# Patient Record
Sex: MALE | Race: BLACK OR AFRICAN AMERICAN | Employment: FULL TIME | URBAN - METROPOLITAN AREA
[De-identification: names, ages, dates, MRNs, and addresses within clinical notes are randomized per-mention and may not be internally consistent; named-entity substitution may affect disease eponyms.]

---

## 2017-02-28 ENCOUNTER — OFFICE VISIT (OUTPATIENT)
Dept: INTERNAL MEDICINE CLINIC | Age: 27
End: 2017-02-28

## 2017-02-28 VITALS
WEIGHT: 315 LBS | BODY MASS INDEX: 38.36 KG/M2 | DIASTOLIC BLOOD PRESSURE: 87 MMHG | RESPIRATION RATE: 19 BRPM | SYSTOLIC BLOOD PRESSURE: 124 MMHG | HEART RATE: 93 BPM | HEIGHT: 76 IN

## 2017-02-28 DIAGNOSIS — I10 ESSENTIAL HYPERTENSION: Primary | ICD-10-CM

## 2017-02-28 PROCEDURE — G8484 FLU IMMUNIZE NO ADMIN: HCPCS | Performed by: INTERNAL MEDICINE

## 2017-02-28 PROCEDURE — 99213 OFFICE O/P EST LOW 20 MIN: CPT | Performed by: INTERNAL MEDICINE

## 2017-02-28 PROCEDURE — G8427 DOCREV CUR MEDS BY ELIG CLIN: HCPCS | Performed by: INTERNAL MEDICINE

## 2017-02-28 PROCEDURE — G8417 CALC BMI ABV UP PARAM F/U: HCPCS | Performed by: INTERNAL MEDICINE

## 2017-02-28 PROCEDURE — 1036F TOBACCO NON-USER: CPT | Performed by: INTERNAL MEDICINE

## 2017-02-28 RX ORDER — AMLODIPINE BESYLATE 5 MG/1
5 TABLET ORAL DAILY
Qty: 30 TABLET | Refills: 5 | Status: SHIPPED | OUTPATIENT
Start: 2017-02-28 | End: 2018-04-24 | Stop reason: SDUPTHER

## 2017-02-28 ASSESSMENT — PATIENT HEALTH QUESTIONNAIRE - PHQ9
SUM OF ALL RESPONSES TO PHQ9 QUESTIONS 1 & 2: 0
2. FEELING DOWN, DEPRESSED OR HOPELESS: 0
SUM OF ALL RESPONSES TO PHQ QUESTIONS 1-9: 0
1. LITTLE INTEREST OR PLEASURE IN DOING THINGS: 0

## 2017-07-25 ENCOUNTER — HOSPITAL ENCOUNTER (OUTPATIENT)
Dept: OTHER | Age: 27
Discharge: HOME | End: 2017-07-25
Attending: PREVENTIVE MEDICINE | Admitting: PREVENTIVE MEDICINE

## 2017-07-27 LAB
NIL (NEGATIVE) SPOT CONTROL: 1
PANEL A SPOT COUNT: 0
PANEL B SPOT COUNT: 3
POSITIVE CONTROL SPOT COUNT: >20
TB CELL IMMUNE MEASURE: NEGATIVE

## 2018-04-24 ENCOUNTER — OFFICE VISIT (OUTPATIENT)
Dept: INTERNAL MEDICINE CLINIC | Age: 28
End: 2018-04-24

## 2018-04-24 VITALS
BODY MASS INDEX: 46.37 KG/M2 | HEART RATE: 88 BPM | SYSTOLIC BLOOD PRESSURE: 136 MMHG | RESPIRATION RATE: 18 BRPM | DIASTOLIC BLOOD PRESSURE: 74 MMHG | WEIGHT: 315 LBS

## 2018-04-24 DIAGNOSIS — Z00.00 ROUTINE GENERAL MEDICAL EXAMINATION AT A HEALTH CARE FACILITY: Primary | ICD-10-CM

## 2018-04-24 DIAGNOSIS — I10 ESSENTIAL HYPERTENSION: ICD-10-CM

## 2018-04-24 DIAGNOSIS — Z00.00 ROUTINE GENERAL MEDICAL EXAMINATION AT A HEALTH CARE FACILITY: ICD-10-CM

## 2018-04-24 LAB
A/G RATIO: 1.8 (ref 1.1–2.2)
ALBUMIN SERPL-MCNC: 4.7 G/DL (ref 3.4–5)
ALP BLD-CCNC: 112 U/L (ref 40–129)
ALT SERPL-CCNC: 23 U/L (ref 10–40)
ANION GAP SERPL CALCULATED.3IONS-SCNC: 18 MMOL/L (ref 3–16)
AST SERPL-CCNC: 18 U/L (ref 15–37)
BASOPHILS ABSOLUTE: 0 K/UL (ref 0–0.2)
BASOPHILS RELATIVE PERCENT: 0.6 %
BILIRUB SERPL-MCNC: 0.5 MG/DL (ref 0–1)
BUN BLDV-MCNC: 12 MG/DL (ref 7–20)
CALCIUM SERPL-MCNC: 9.6 MG/DL (ref 8.3–10.6)
CHLORIDE BLD-SCNC: 100 MMOL/L (ref 99–110)
CHOLESTEROL, TOTAL: 205 MG/DL (ref 0–199)
CO2: 26 MMOL/L (ref 21–32)
CREAT SERPL-MCNC: 0.9 MG/DL (ref 0.9–1.3)
EOSINOPHILS ABSOLUTE: 0.2 K/UL (ref 0–0.6)
EOSINOPHILS RELATIVE PERCENT: 3.4 %
GFR AFRICAN AMERICAN: >60
GFR NON-AFRICAN AMERICAN: >60
GLOBULIN: 2.6 G/DL
GLUCOSE BLD-MCNC: 87 MG/DL (ref 70–99)
HCT VFR BLD CALC: 48.5 % (ref 40.5–52.5)
HDLC SERPL-MCNC: 36 MG/DL (ref 40–60)
HEMOGLOBIN: 16.3 G/DL (ref 13.5–17.5)
LDL CHOLESTEROL CALCULATED: 140 MG/DL
LYMPHOCYTES ABSOLUTE: 2.7 K/UL (ref 1–5.1)
LYMPHOCYTES RELATIVE PERCENT: 37.6 %
MCH RBC QN AUTO: 29.5 PG (ref 26–34)
MCHC RBC AUTO-ENTMCNC: 33.6 G/DL (ref 31–36)
MCV RBC AUTO: 87.7 FL (ref 80–100)
MONOCYTES ABSOLUTE: 0.5 K/UL (ref 0–1.3)
MONOCYTES RELATIVE PERCENT: 7.4 %
NEUTROPHILS ABSOLUTE: 3.6 K/UL (ref 1.7–7.7)
NEUTROPHILS RELATIVE PERCENT: 51 %
PDW BLD-RTO: 14.3 % (ref 12.4–15.4)
PLATELET # BLD: 359 K/UL (ref 135–450)
PMV BLD AUTO: 7.8 FL (ref 5–10.5)
POTASSIUM SERPL-SCNC: 4.3 MMOL/L (ref 3.5–5.1)
RBC # BLD: 5.53 M/UL (ref 4.2–5.9)
SODIUM BLD-SCNC: 144 MMOL/L (ref 136–145)
TOTAL PROTEIN: 7.3 G/DL (ref 6.4–8.2)
TRIGL SERPL-MCNC: 145 MG/DL (ref 0–150)
TSH REFLEX: 4.13 UIU/ML (ref 0.27–4.2)
VLDLC SERPL CALC-MCNC: 29 MG/DL
WBC # BLD: 7.1 K/UL (ref 4–11)

## 2018-04-24 PROCEDURE — 99395 PREV VISIT EST AGE 18-39: CPT | Performed by: INTERNAL MEDICINE

## 2018-04-24 RX ORDER — AMLODIPINE BESYLATE 5 MG/1
5 TABLET ORAL DAILY
Qty: 30 TABLET | Refills: 11 | Status: SHIPPED | OUTPATIENT
Start: 2018-04-24 | End: 2019-05-01 | Stop reason: SDUPTHER

## 2018-04-24 ASSESSMENT — PATIENT HEALTH QUESTIONNAIRE - PHQ9
SUM OF ALL RESPONSES TO PHQ QUESTIONS 1-9: 0
1. LITTLE INTEREST OR PLEASURE IN DOING THINGS: 0
2. FEELING DOWN, DEPRESSED OR HOPELESS: 0
SUM OF ALL RESPONSES TO PHQ9 QUESTIONS 1 & 2: 0

## 2018-08-28 ENCOUNTER — HOSPITAL ENCOUNTER (OUTPATIENT)
Dept: PHYSICAL THERAPY | Age: 28
Discharge: HOME | End: 2018-08-31

## 2018-08-28 ASSESSMENT — PAIN DESCRIPTION - PROGRESSION: CLINICAL_PROGRESSION: GRADUALLY IMPROVING

## 2018-08-28 ASSESSMENT — PAIN DESCRIPTION - LOCATION: LOCATION: ARM;SHOULDER

## 2018-08-28 ASSESSMENT — PAIN SCALES - GENERAL: PAINLEVEL_OUTOF10: 4

## 2018-08-28 ASSESSMENT — PAIN DESCRIPTION - DESCRIPTORS: DESCRIPTORS: BURNING;SHARP

## 2018-08-28 ASSESSMENT — PAIN DESCRIPTION - DIRECTION: RADIATING_TOWARDS: UPPER ARM

## 2018-08-28 ASSESSMENT — PAIN DESCRIPTION - PAIN TYPE: TYPE: ACUTE PAIN

## 2018-08-28 ASSESSMENT — PAIN DESCRIPTION - ORIENTATION: ORIENTATION: LEFT

## 2018-08-28 ASSESSMENT — PAIN DESCRIPTION - FREQUENCY: FREQUENCY: CONTINUOUS

## 2018-08-28 ASSESSMENT — PAIN DESCRIPTION - ONSET: ONSET: SUDDEN

## 2018-08-28 NOTE — PLAN OF CARE
Manual Therapy               [] Aquatic Therapy       Other:    ? Frequency/Duration:  # Days per week: [] 1 day # Weeks: [] 1 week [x] 5 weeks     [x] 2 days?    [] 2 weeks [] 6 weeks     [] 3 days   [] 3 weeks [] 7 weeks     [] 4 days   [] 4 weeks [] 8 weeks         [] 9 weeks [] 10 weeks         [] 11 weeks [] 12 weeks    Rehab Potential/Progress: [x] Excellent [] Good [] Fair  [] Poor     Goals:      Short term goals  Time Frame for Short term goals: 2 weeks, 9/14/18  Short term goal 1: Pt will be able to tolerate gradually advancing activity/exercises w/o increased pain  Short term goal 2: Pt will be able to report min pain w/ usual daily activity  Long term goals  Time Frame for Long term goals : 4 weeks, 9/28/18  Long term goal 1: Pt will be independent w/ HEP and able to continue to manage his pain on his own after PT  Long term goal 2: Pt will be able to return to all prior work and home activity including lifting w/o pain  Long term goal 3: Pt will have equal and normal ROM and strength L UE w/o pain for usual activity    G-Code Selection: (On Eval and every 10th visit or Discharge)  MEASURE  [] Mobility: Walking and Moving Around     [] Current ()   [] Goal ()   [] DC ()  [] Changing/Maintaining Body Position     [] Current (8981)      [] Goal ()   [] DC ()  [x] Carrying / Moving / Handling Objects     [x] Current ()   [x] Goal ()   [] DC ()  [] Self-Care     [] Current ()   [] Goal ()   [] DC ()  [] Other PT/OT primary DX     [] Current ()   [] Goal ()   [] DC ()    SEVERITY  CURRENT  GOAL  DISCHARGE   [] CH (0% Impaired, Indep.)  [x] CI (1-19% Impaired, SBA-CGA)  [] CJ (20-39% Impaired, MIN A)  [] CK  (40-59% Impairment, Mod A)  [] CL  (60-79% Impairment, Max A)  [] CM  (80-99% Impairment, Dep.)   [] CN  (100% Impairment, Tot Dep.) [x] CH (0% Impaired, Indep.)  [] CI (1-19% Impaired, SBA-CGA)  [] CJ (20-39% Impaired, MIN A)  [] CK (40-59% Impairment, Mod A)  [] CL  (60-79% Impairment, Max A)  [] CM  (80-99% Impairment, Dep.)   [] CN  (100% Impairment, Tot Dep.)  [] CH (0% Impaired, Indep.)  [] CI (1-19% Impaired, SBA-CGA)  [] CJ (20-39% Impaired, MIN A)  [] CK  (40-59% Impairment, Mod A)  [] CL  (60-79% Impairment, Max A)  [] CM  (80-99% Impairment, Dep.)   [] CN  (100% Impairment, Tot Dep.)          Electronically signed by:  Daiana Macias, PT, MPT, ATC  8/28/2018, 9:00 AM    8/28/2018, 9:01 AM  If you have any questions or concerns, please don't hesitate to call.   Thank you for your referral.      Physician Signature:________________________________Date:_________ TIME: _____  By signing above, therapists plan is approved by physician

## 2018-08-28 NOTE — PROGRESS NOTES
Physical Therapy  Initial Assessment  Date: 2018  Patient Name: Peter Laird  MRN: [id number]  : 1990     Treatment Diagnosis: L shoulder RTC/deltoid strain, pain, weakness    Restrictions  Position Activity Restriction  Other position/activity restrictions: none really, watch lifting and OH    Subjective   General  Chart Reviewed: Yes  Patient assessed for rehabilitation services?: Yes  Additional Pertinent Hx: helping pt get into truck and did most of lifting up and felt something in L shoulder, pain, this was in early Aug.  felt a pull in shoulder,  xray negative. No Cortizone but Naproxyn. Referring Practitioner: Karlee Nazario  Diagnosis: L shoulder pain/deltoid strain  PT Visit Information  PT Insurance Information: [facility]  Total # of Visits Approved: 10  Plan of Care/Certification Expiration Date: 10/15/18  Subjective  Subjective: Pain is improving some, increased by lifting heavy objects or reaching OH, better w/ rest, ice at first, meds. Sometimes pain during sleep, but can re-adjust and not lose too much sleep, he does note crepitus w/ movement  Pain Screening  Patient Currently in Pain: Yes  Pain Assessment  Pain Assessment: 0-10  Pain Level: 4 (max pain 8/10)  Pain Type: Acute pain  Pain Location: Arm; Shoulder  Pain Orientation: Left  Pain Radiating Towards: upper arm  Pain Descriptors: Burning; Sharp  Pain Frequency: Continuous  Pain Onset: Sudden  Clinical Progression: Gradually improving  Effect of Pain on Daily Activities: lifting and OH mostly  Patient's Stated Pain Goal: No pain  Vital Signs  Patient Currently in Pain: Yes    Vision/Hearing  Vision  Vision: Impaired    Orientation  Orientation  Overall Orientation Status: Within Normal Limits    Social/Functional History  Social/Functional History  Lives With: Alone  Type of Home: House  Occupation: Full time employment  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[employer], some transporting, some lifting of pts, lifting linens and such   Leisure & Hobbies: bball some, some golf and fishing at times too, does some cooking too  Objective     Observation/Palpation  Palpation: TTP anterior shoulder more than subacrom and min UT too L side    AROM RUE (degrees)  RUE AROM : WNL  AROM LUE (degrees)  LUE General AROM: norm flex and abd shoulder w/ min pain, ER scratch limited w/ pain to C7 vs T4 R, IR scratch min limit and min pain    Strength RUE  Strength RUE: WFL  Comment: flex and ER weaker than L  Strength LUE  Strength LUE: WFL  Comment: min pain on ER      Additional Measures  Special Tests: + Neer and Osorio on L and more pain w// supination on Butts, negative cross  Sensation  Overall Sensation Status: WNL    Assessment   Conditions Requiring Skilled Therapeutic Intervention  Body structures, Functions, Activity limitations: Decreased functional mobility ; Decreased ROM; Decreased strength;Decreased high-level IADLs  Assessment: Pt is a 34 yo male who presents w/ L shoulder pain from recent injury at work. He demonstrates TTP in anterior shoulder w/ min limited ROM and strength but decreased endurance and tolerance to functional activity and lifting. He will benefit from PT to help restore his ROM, strength and functional lifting ability in L UE. Prior to August of this year he had no pain or limited use in L UE. Patient agrees with established plan of care and assisted in the development of their short term and long term goals. Patient had no adverse reaction with initial treatment and there are no barriers to learning. Demonstrates no mental or cognitive disorder.     Treatment Diagnosis: L shoulder RTC/deltoid strain, pain, weakness  Prognosis: Excellent  Decision Making: Low Complexity  Patient Education: see flow sheet  Barriers to Learning: none  REQUIRES PT FOLLOW UP: Yes         Plan   Plan  Times per week: 2x/week  Plan weeks: 5 weeks  Specific instructions for Next Treatment: progress ROM and strength to stephon, ice prn, practice

## 2018-08-28 NOTE — FLOWSHEET NOTE
Other Therapeutic Activities/Education:  Patient received education on their current pathology and how their condition effects them with their functional activities. Patient understood discussion and questions were answered. Patient understands their activity limitations and understands rational for treatment progression. Home Exercise Program:  Issued, practiced and pt demo ability to perform 8/28/2018       Modality/intervention planned:    [x] Therapeutic Exercise  [x] Modalities:  [x] Therapeutic Activity     [] Ultrasound  [] Elec  Stim  [] Gait Training      [] Cervical Traction [] Lumbar Traction  [x] Neuromuscular Re-education    [x] Cold/hotpack [] Iontophoresis   [x] Instruction in HEP      [] Vasopneumatic     [x] Manual Therapy               [] Aquatic Therapy     Manual Treatments:      Modalities: CP to L shoulder, pt seated, 10'    Communication with other providers:  POC faxed 8/28/18    Education provided to patient/caregiver: Adverse reactions to treatment:      Equipment provided:      Assessment:  Assessment: Pt is a 32 yo male who presents w/ L shoulder pain from recent injury at work. He demonstrates TTP in anterior shoulder w/ min limited ROM and strength but decreased endurance and tolerance to functional activity and lifting. He will benefit from PT to help restore his ROM, strength and functional lifting ability in L UE. Prior to August of this year he had no pain or limited use in L UE. Patient agrees with established plan of care and assisted in the development of their short term and long term goals. Patient had no adverse reaction with initial treatment and there are no barriers to learning. Demonstrates no mental or cognitive disorder.       Time In / Time Out:    0810/0900               Timed Code/Total Treatment Minutes:  25/50    Patients Report of Tolerance:    [] Patient limited by fatigue        [] Patient limited by pain   [] Patient limited by other medical complications   [x] Other: tolerated w/ min pain    Prognosis:   [x] Good [] Fair  [] Poor    Plan:   [] Continue per plan of care [] Alter current plan (see comments)  [x] Plan of care initiated [] Hold pending MD visit [] Discharge    Plan for Next Session:   progress ROM and strength to stephon, ice prn, practice functional        Next Progress Note due:     At d/c       Electronically signed by:  Daiana Macias, PT, MPT, ATC  8/28/2018, 9:02 AM    8/28/2018, 9:04 AM

## 2018-08-31 ENCOUNTER — HOSPITAL ENCOUNTER (OUTPATIENT)
Dept: PHYSICAL THERAPY | Age: 28
Discharge: HOME | End: 2018-08-31

## 2018-08-31 NOTE — FLOWSHEET NOTE
Outpatient Physical Therapy           [facility]           [x] Phone: [phone]   Fax: [phone]  [place]           [] Phone: [phone]   Fax: [phone]    Physical Therapy Daily Treatment Note  Date:  2018    Patient Name:  Nita Nelson    :  1990  MRN: [id number]  Restrictions/Precautions: Other position/activity restrictions: none really, watch lifting and OH  Diagnosis:   Diagnosis: L shoulder pain/deltoid strain  Date of Surgery:   Treatment Diagnosis: Treatment Diagnosis: L shoulder RTC/deltoid strain, pain, weakness    Insurance/Certification information: PT Insurance Information: [facility]--10 Rx by 10/15   Referring Physician:  Referring Practitioner: Catarina Maguire  Next Doctor Visit:    Plan of care signed (Y/N): pending   Visit# / total visits:   2/10 C9 by 10/15  Pain level: 2/10   Goals:       Short term goals  Time Frame for Short term goals: 2 weeks, 18  Short term goal 1: Pt will be able to tolerate gradually advancing activity/exercises w/o increased pain  Short term goal 2: Pt will be able to report min pain w/ usual daily activity  Long term goals  Time Frame for Long term goals : 4 weeks, 18  Long term goal 1: Pt will be independent w/ HEP and able to continue to manage his pain on his own after PT  Long term goal 2: Pt will be able to return to all prior work and home activity including lifting w/o pain  Long term goal 3: Pt will have equal and normal ROM and strength L UE w/o pain for usual activity         Subjective:   Feeling pretty good today. HEP made it sore, but not bad. Any changes in Ambulatory Summary Sheet? NO      Objective:  Pt has some fatigue w/ scap exercises but good form and just soreness not pain.             Exercises:  Exercise/Equipment 18 Date Date            Supine cane flex    2\" hold 3x  10x5\"     Supine cane ER at 90   2\" hold 3x 10x5\"              Chest press TB    GTT 10x GTT 25x BTT    row   GTT 10x BTT 20x Taffy pull  GTB 10x GTB 20x     Wall push up   10x 20x Counter? Pritesh Lacer on wall 4way 10xea  20xea     Wall saw  - RSB 20x     Wall walk lateral w/ TB for scap -                  supine chest press   -- 3# B 20x      supine ABC -- 3# 1x      prone ext  row  -- 3# 20x  3# 20x                                                             Other Therapeutic Activities/Education:        Home Exercise Program:  Issued, practiced and pt demo ability to perform 8/28/2018       Modality/intervention planned:    [x] Therapeutic Exercise  [x] Modalities:  [x] Therapeutic Activity     [] Ultrasound  [] Elec  Stim  [] Gait Training      [] Cervical Traction [] Lumbar Traction  [x] Neuromuscular Re-education    [x] Cold/hotpack [] Iontophoresis   [x] Instruction in HEP      [] Vasopneumatic     [x] Manual Therapy               [] Aquatic Therapy     Manual Treatments: none today    Modalities: CP to L shoulder, pt seated, 10'    Communication with other providers:  POC faxed 8/28/18    Education provided to patient/caregiver: Adverse reactions to treatment:      Equipment provided:      Assessment:  Pt tolerated Rx very well today w/ just soreness after and no increased pain. He continues w/ tenderness and pain and limited functional strength/tolerance to usual activity and will benefit from continued PT to gradually increase challenge of activity to help him restore PLOF.       Time In / Time Out:   1500/1550             Timed Code/Total Treatment Minutes:   40/40 (CP not billed)    Patients Report of Tolerance:    [] Patient limited by fatigue        [] Patient limited by pain   [] Patient limited by other medical complications   [x] Other: tolerated w/ min pain    Prognosis:   [x] Good [] Fair  [] Poor    Plan:   [] Continue per plan of care [] Alter current plan (see comments)  [x] Plan of care initiated [] Hold pending MD visit [] Discharge    Plan for Next Session:   progress ROM and strength to stephon, ice prn, practice functional        Next Progress Note due:     At d/c       Electronically signed by:  Marlyn Guzmán PT, MPT, ATC  8/31/2018, 7:29 AM     8/31/2018, 4:02 PM

## 2018-09-01 ENCOUNTER — HOSPITAL ENCOUNTER (OUTPATIENT)
Dept: OTHER | Age: 28
Discharge: HOME | End: 2018-09-01

## 2018-09-05 ENCOUNTER — HOSPITAL ENCOUNTER (OUTPATIENT)
Dept: PHYSICAL THERAPY | Age: 28
Discharge: HOME | End: 2018-09-05

## 2018-09-05 NOTE — FLOWSHEET NOTE
Outpatient Physical Therapy           [facility]           [x] Phone: [phone]   Fax: [phone]  [place]           [] Phone: [phone]   Fax: [phone]    Physical Therapy Daily Treatment Note  Date:  2018    Patient Name:  Willis Olsen    :  1990  MRN: [id number]  Restrictions/Precautions: Other position/activity restrictions: none really, watch lifting and OH  Diagnosis:   Diagnosis: L shoulder pain/deltoid strain  Date of Surgery:   Treatment Diagnosis: Treatment Diagnosis: L shoulder RTC/deltoid strain, pain, weakness    Insurance/Certification information: PT Insurance Information: [facility]--10 Rx by 10/15   Referring Physician:  Referring Practitioner: Janet Steven  Next Doctor Visit:    Plan of care signed (Y/N): pending   Visit# / total visits:   3/10 C9 by 10/15  Pain level: 0/10       Goals:       Short term goals  Time Frame for Short term goals: 2 weeks, 18  Short term goal 1: Pt will be able to tolerate gradually advancing activity/exercises w/o increased pain Progressing   Short term goal 2: Pt will be able to report min pain w/ usual daily activity  Long term goals  Time Frame for Long term goals : 4 weeks, 18  Long term goal 1: Pt will be independent w/ HEP and able to continue to manage his pain on his own after PT  Long term goal 2: Pt will be able to return to all prior work and home activity including lifting w/o pain  Long term goal 3: Pt will have equal and normal ROM and strength L UE w/o pain for usual activity         Subjective:   Patient reports he currently has no pain int he shoulder, feels like the shoulder is getting better. Any changes in Ambulatory Summary Sheet? NO      Objective:  Cues to reduce UT activation during rows. Struggled quite a bit with proper muscle firing patterns for prone row.           Exercises:  Exercise/Equipment 18 Date          UBE    2'/2' f/b @ 120                    Supine cane flex    2\"

## 2018-09-13 ENCOUNTER — HOSPITAL ENCOUNTER (OUTPATIENT)
Dept: PHYSICAL THERAPY | Age: 28
Discharge: HOME | End: 2018-09-13

## 2018-09-13 NOTE — FLOWSHEET NOTE
Outpatient Physical Therapy           [facility]           [x] Phone: [phone]   Fax: [phone]  [facility]           [] Phone: [phone]   Fax: [phone]    Physical Therapy Daily Treatment Note  Date:  2018    Patient Name:  Lilibeth Chapman    :  1990  MRN: [id number]  Restrictions/Precautions: Other position/activity restrictions: none really, watch lifting and OH  Diagnosis:   Diagnosis: L shoulder pain/deltoid strain  Date of Surgery:   Treatment Diagnosis: Treatment Diagnosis: L shoulder RTC/deltoid strain, pain, weakness    Insurance/Certification information: PT Insurance Information: [facility]--10 Rx by 10/15   Referring Physician:  Referring Practitioner: Tati Lazo  Next Doctor Visit:    Plan of care signed (Y/N): Y  Visit# / total visits:   4/10 C9 by 10/15  Pain level: 0/10       Goals:       Short term goals  Time Frame for Short term goals: 2 weeks, 18  Short term goal 1: Pt will be able to tolerate gradually advancing activity/exercises w/o increased pain Progressing   Short term goal 2: Pt will be able to report min pain w/ usual daily activity Met   Long term goals  Time Frame for Long term goals : 4 weeks, 18  Long term goal 1: Pt will be independent w/ HEP and able to continue to manage his pain on his own after PT Progressing - reports compliance with HEP  Long term goal 2: Pt will be able to return to all prior work and home activity including lifting w/o pain Mostly met   Long term goal 3: Pt will have equal and normal ROM and strength L UE w/o pain for usual activity Met for ROM, did not check strength today          Subjective:   Patient states that the shoulder feels pretty good overall, really feels like it's getting better, pain is less often and less intense when it does happen. Max pain since here last was 3/10, doesn't remember what he was doing. No pain with working last night.  Reports no issues with lifting, or reaching behind his back or behind his head. Has some very mild pain with OH activity. Any changes in Ambulatory Summary Sheet? NO      Objective:  Fatigue with OH ball on wall, had to take a couple of breaks. AROM  Equal and full bilaterally and with no pain          Exercises:  Exercise/Equipment 8/28/18 8/31/18 9/5/18 9/13/18          UBE    2'/2' f/b @ 120 2'/2' f/b @ 100                   Supine cane flex    2\" hold 3x  10x5\" 10*5\" -   Supine cane ER at 90   2\" hold 3x 10x5\" 10*5\" -            Chest press TB    GTT 10x GTT 25x BTT 2*10 BTT 2*15   row   GTT 10x BTT 20x BTT 2*10 BTT 2*15   Taffy pull  GTB 10x GTB 20x GTB 20* GTB 2*15   Wall push up   10x 20x Counter 2*10 Counter 2*10   Ball on wall 4way 10xea  20xea 20* ea  2# in flexion    Wall saw  - RSB 20x RSB 20* RSB 25*   Wall walk lateral w/ TB for scap -  - Forgot               supine chest press   -- 3# B 20x 3# 20* -   supine ABC -- 3# 1x 3# 1* -   prone   ext  row   HA -- 3# 20x  3# 20x 3# 20*  3# 20* Prone on ball   3# 20*  3# 20*  Next             Body blade IR/ER, abd at side, flexion at 90°    2*30\" ea             D1 extension     GTT 2*10   Lat pull down     DGTT 2*10            Rebounder toss    6# 20* CP, OH      Other Therapeutic Activities/Education:        Home Exercise Program:  Issued, practiced and pt demo ability to perform 8/28/2018; added D1 extension and lat pull down 9/13;       Modality/intervention planned:    [x] Therapeutic Exercise  [x] Modalities:  [x] Therapeutic Activity     [] Ultrasound  [] Elec  Stim  [] Gait Training      [] Cervical Traction [] Lumbar Traction  [x] Neuromuscular Re-education    [x] Cold/hotpack [] Iontophoresis   [x] Instruction in HEP      [] Vasopneumatic     [x] Manual Therapy               [] Aquatic Therapy     Manual Treatments: none today    Modalities:  Patient declined today.     Communication with other providers:  POC faxed 8/28/18    Education provided to patient/caregiver:  None     Adverse reactions to

## 2018-09-19 ENCOUNTER — HOSPITAL ENCOUNTER (OUTPATIENT)
Dept: PHYSICAL THERAPY | Age: 28
Discharge: HOME | End: 2018-09-19

## 2018-09-19 NOTE — FLOWSHEET NOTE
Outpatient Physical Therapy           [place]           [x] Phone: [phone]   Fax: [phone]  [place]           [] Phone: [phone]   Fax: [phone]    Physical Therapy Daily Treatment Note  Date:  2018    Patient Name:  Luisa Najera    :  1990  MRN: [id number]  Restrictions/Precautions: Other position/activity restrictions: none really, watch lifting and OH  Diagnosis:   Diagnosis: L shoulder pain/deltoid strain  Date of Surgery:   Treatment Diagnosis: Treatment Diagnosis: L shoulder RTC/deltoid strain, pain, weakness    Insurance/Certification information: PT Insurance Information: [facility]--10 Rx by 10/15   Referring Physician:  Referring Practitioner: Go Gruber  Next Doctor Visit:    Plan of care signed (Y/N): Y  Visit# / total visits:   5/10 C9 by 10/15  Pain level: 0/10       Goals:       Short term goals  Time Frame for Short term goals: 2 weeks, 18  Short term goal 1: Pt will be able to tolerate gradually advancing activity/exercises w/o increased pain Progressing   Short term goal 2: Pt will be able to report min pain w/ usual daily activity Met   Long term goals  Time Frame for Long term goals : 4 weeks, 18  Long term goal 1: Pt will be independent w/ HEP and able to continue to manage his pain on his own after PT Progressing - reports compliance with HEP  Long term goal 2: Pt will be able to return to all prior work and home activity including lifting w/o pain Mostly met   Long term goal 3: Pt will have equal and normal ROM and strength L UE w/o pain for usual activity Met for ROM, did not check strength today          Subjective:   Patient reports no pain in the shoulder. \"Feels pretty good\". Felt fine after new exercises last session. Doesn't do a lot of OH lifting at work. Some pushing/pulling. 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 Any changes in Ambulatory Summary Sheet?   NO      Objective:  Min cueing for appropriate scap activation. Exercises:  Exercise/Equipment 9/5/18 9/13/18 9/19/18         UBE  2'/2' f/b @ 120 2'/2' f/b @ 100 2'/2' f/b @ 60                 Supine cane flex    10*5\" - -   Supine cane ER at 90   10*5\" - -           Chest press TB    BTT 2*10 BTT 2*15 BlkTT 2*15   row  (NR) BTT 2*10 BTT 2*15 Single arm FT 3plt 2*10   Taffy pull (NR) GTB 20* GTB 2*15 BTB 2*15   Wall push up  (TA) Counter 2*10 Counter 2*10 Shuttle 2C 2*10   Ball on wall 4way (TA) 20* ea  2# in flexion  2# in flexion 20* ea dir    Wall saw (NR) RSB 20* RSB 25* RSB 25*   Wall walk lateral w/ TB for scap - Forgot  GTB 4*20'             supine chest press   3# 20* - -   supine ABC 3# 1* - -   prone (NR)  ext  row   HA 3# 20*  3# 20* Prone on ball   3# 20*  3# 20*  Next  Prone on ball   3# 20*  3# 20*  0# 20*           Body blade IR/ER, abd at side, flexion at 90° (NR)  2*30\" ea  2*30\" ea            D1 extension (NR)  GTT 2*10 GTT 2*15   D2 flexion (NR)   GTT 2*10   Lat pull down (NR)  DGTT 2*10 CC 70# 2*10   Straight arm pull down    CC 50# 2*10   Fitter side/side    2 blk bands 20*   Sled push-pull (TA)   +80# 4*50'   TRX rows (TA)   20*   Rebounder toss (NR)  6# 20* CP, OH  6# 20* CP, OH      Other Therapeutic Activities/Education:        Home Exercise Program:  Issued, practiced and pt demo ability to perform 8/28/2018; added D1 extension and lat pull down 9/13;       Modality/intervention planned:    [x] Therapeutic Exercise  [x] Modalities:  [x] Therapeutic Activity     [] Ultrasound  [] Elec  Stim  [] Gait Training      [] Cervical Traction [] Lumbar Traction  [x] Neuromuscular Re-education    [x] Cold/hotpack [] Iontophoresis   [x] Instruction in HEP      [] Vasopneumatic     [x] Manual Therapy               [] Aquatic Therapy     Manual Treatments: none today    Modalities:  Patient declined today.     Communication with other providers:  POC faxed 8/28/18    Education provided to patient/caregiver:  None     Adverse reactions to treatment:  None     Equipment provided:  None     Assessment:   Patient continues to tolerate exercise progressions at each session without an increase in pain in the shoulder. Will benefit from a few more sessions to push progression and ensure patient has full strength without pain. Scapular activation is improving. 2/10 soreness after rx.     Time In / Time Out:  1015/1110        Timed Code/Total Treatment Minutes:  54' 1 TA 15' 1 TE 12' 2 NR 28'    Patients Report of Tolerance:    [] Patient limited by fatigue        [] Patient limited by pain   [] Patient limited by other medical complications   [x] Other: tolerated w/ min pain    Prognosis:   [x] Good [] Fair  [] Poor    Plan:   [x] Continue per plan of care [] Alter current plan (see comments)  [] Plan of care initiated [] Hold pending MD visit [] Discharge    Plan for Next Session:   progress ROM and strength to stephon, ice prn, practice functional        Next Progress Note due:     At d/c       Electronically signed by:  Chary Trinidad PTA       9/19/2018, 9:45 AM

## 2018-09-25 ENCOUNTER — HOSPITAL ENCOUNTER (OUTPATIENT)
Dept: PHYSICAL THERAPY | Age: 28
Setting detail: THERAPIES SERIES
Discharge: HOME | End: 2018-09-25
Payer: COMMERCIAL

## 2018-09-25 PROCEDURE — G8985 CARRY GOAL STATUS: HCPCS

## 2018-09-25 PROCEDURE — 97110 THERAPEUTIC EXERCISES: CPT

## 2018-09-25 PROCEDURE — G8986 CARRY D/C STATUS: HCPCS

## 2018-09-25 PROCEDURE — 97530 THERAPEUTIC ACTIVITIES: CPT

## 2018-09-25 NOTE — FLOWSHEET NOTE
Outpatient Physical Therapy           [place]           [x] Phone: [phone]   Fax: [phone]  [place]           [] Phone: [phone]   Fax: [phone]    Physical Therapy Daily Treatment Note  Date:  2018    Patient Name:  Milka Blanca    :  1990  MRN: [id number]  Restrictions/Precautions: Other position/activity restrictions: none really, watch lifting and OH  Diagnosis:   Diagnosis: L shoulder pain/deltoid strain  Date of Surgery:   Treatment Diagnosis: Treatment Diagnosis: L shoulder RTC/deltoid strain, pain, weakness    Insurance/Certification information: PT Insurance Information: [facility]--10 Rx by 10/15   Referring Physician:  Referring Practitioner: Esthela Meléndez Doctor Visit:    Plan of care signed (Y/N): Y  Visit# / total visits:   7/10 C9 by 10/15  Pain level: 0/10       Goals:       Short term goals  Time Frame for Short term goals: 2 weeks, 18  Short term goal 1: Pt will be able to tolerate gradually advancing activity/exercises w/o increased pain Met  Short term goal 2: Pt will be able to report min pain w/ usual daily activity Met   Long term goals  Time Frame for Long term goals : 4 weeks, 18  Long term goal 1: Pt will be independent w/ HEP and able to continue to manage his pain on his own after PT Progressing - reports compliance with HEP  Long term goal 2: Pt will be able to return to all prior work and home activity including lifting w/o pain Met   Long term goal 3: Pt will have equal and normal ROM and strength L UE w/o pain for usual activity Met for ROM, did not check strength today          Subjective: Pt feeling really good overall, no pain since here last and no reported limitations at work or home. Any changes in Ambulatory Summary Sheet? NO      Objective:   MMT is mostly equal B now and pain free. Mechanics w/ exercises are good but pt is somewhat weak for his size overall in certain patterns for shoulder movements.   Pt had some fatigue w/ exercises.              Exercises:  Exercise/Equipment 9/13/18 9/19/18 9/21/18 9/25/18          UBE  2'/2' f/b @ 100 2'/2' f/b @ 61 2'/2' f/b @ 61 2'/2' f/b @ 60                            Chest press TB    BTT 2*15 BlkTT 2*15 DGTT 2*15 DGTT 2x15   row  (NR) BTT 2*15 Single arm FT 3plt 2*10 3plt 2*10 DGTT 15x2   Taffy pull (NR) GTB 2*15 BTB 2*15 BTB 2*15 BTB 15x2   Wall push up  (TA) Counter 2*10 Shuttle 2C 2*10 2C 2*10 Walk out on ball / pu 10x   Ball on wall 4way (TA) 2# in flexion  2# in flexion 20* ea dir  2# in flexion 20* ea 2# in flexion 20x ea   Wall saw (NR) RSB 25* RSB 25* Pink SB 30* Pink SB 30x   Wall walk lateral w/ TB for scap Forgot  GTB 4*20' GTB 4*20' --              prone (NR)  ext  row   HA Prone on ball   3# 20*  3# 20*  Next  Prone on ball   3# 20*  3# 20*  0# 20* Prone on ball   3# 2*15  3# 2*15  20* Prone on ball   3# 20x  3# 20x  3# 20            Body blade IR/ER, abd at side, flexion at 90° (NR) 2*30\" ea  2*30\" ea  2*30\" ea dir  2x30\" ea dir            D1 extension (NR) GTT 2*10 GTT 2*15 - --   D2 flexion (NR)  GTT 2*10 - Flex 3pl 10x2 ext 2 pl  10x2   Incline/OH press   - 5# 15x2   Lat pull down (NR) DGTT 2*10 CC 70# 2*10 CC 70# 2*10 CC 70# 15x2   Straight arm pull down   CC 50# 2*10 CC 50# 2*10 --   Fitter side/side   2 blk bands 20* 2 blk bands 20* --   Sled push-pull (TA)  +80# 4*50' +80# 4*50' +80# 2 \"laps\"   TRX rows (TA)  20* 20* 10x2   Rebounder toss (NR) 6# 20* CP, OH  6# 20* CP, OH  - --     Other Therapeutic Activities/Education:  Reviewed gym work out, ok to do whatever doesn't hurt but start back lighter weights and higher reps and gradually work back up again, modify angles prn like incline vs OH      Home Exercise Program:  Issued, practiced and pt demo ability to perform 8/28/2018; added D1 extension and lat pull down 9/13;       Modality/intervention planned:    [x] Therapeutic Exercise  [x] Modalities:  [x] Therapeutic Activity     [] Ultrasound  [] Elec

## 2018-09-25 NOTE — PROGRESS NOTES
Outpatient Physical Therapy           [place]           [x] Phone: [phone]   Fax: [phone]  [place]           [] Phone: [phone]   Fax: [phone]      To: Jose Juan Roman      From: Daiana Macias, PT     Patient: Nita Nelson                  : 1990  Diagnosis:  L shoulder pain/deltoid strain   Date: 2018  Treatment Diagnosis: L shoulder RTC/deltoid strain, pain, weakness        []  Progress Note                [x]  Discharge Note    Evaluation Date:  18 Total Visits to date:   7 Cancels/No-shows to date:  1    Subjective:  Pt feeling really good overall, no pain since here last and no reported limitations at work or home. Plan of Care/Treatment to date:  [x] Therapeutic Exercise    [x] Modalities:  [x] Therapeutic Activity     [] Ultrasound  [] Electrical Stimulation  [] Gait Training      [] Cervical Traction   [] Lumbar Traction  [x] Neuromuscular Re-education  [x] Cold/hotpack [] Iontophoresis  [x] Instruction in HEP      Other:  [x] Manual Therapy       []  Vasopneumatic  [] Aquatic Therapy       []                    ? Objective/Significant Findings At Last Visit/Comments:  MMT is mostly equal B now and pain free. Mechanics w/ exercises are good but pt is somewhat weak for his size overall in certain patterns for shoulder movements. Pt had some fatigue w/ exercises. Assessment:  Patient tolerated session well. He is doing very well overall and able to continue on his own at the gym at this time w/ full ROM and strength and goals met. No pain after Rx.         Goal Status:  [x] Achieved [] Partially Achieved  [] Not Achieved   Short term goals  Time Frame for Short term goals: 2 weeks, 18  Short term goal 1: Pt will be able to tolerate gradually advancing activity/exercises w/o increased pain Met  Short term goal 2: Pt will be able to report min pain w/ usual daily activity Met   Long term goals  Time Frame for Long term goals : 4 weeks, per initial plan of Care     [x] Patient now discharged     [] Additional visits requested, Please re-certify for additional visits:      Requested frequency/duration:  /week for weeks    If we are requesting more visits, we fully anticipate the patient's condition is expected to improve within the treatment timeframe we are requesting. Electronically signed by:  Esthela Sky, PT, MPT, ATC  9/25/2018, 10:14 AM    9/25/2018, 10:15 AM  If you have any questions or concerns, please don't hesitate to call.   Thank you for your referral.

## 2018-09-27 ENCOUNTER — APPOINTMENT (OUTPATIENT)
Dept: PHYSICAL THERAPY | Age: 28
End: 2018-09-27
Payer: COMMERCIAL

## 2018-10-30 ENCOUNTER — OFFICE VISIT (OUTPATIENT)
Dept: INTERNAL MEDICINE CLINIC | Age: 28
End: 2018-10-30
Payer: COMMERCIAL

## 2018-10-30 VITALS
DIASTOLIC BLOOD PRESSURE: 84 MMHG | SYSTOLIC BLOOD PRESSURE: 139 MMHG | HEART RATE: 88 BPM | RESPIRATION RATE: 15 BRPM | WEIGHT: 315 LBS | BODY MASS INDEX: 46.6 KG/M2 | OXYGEN SATURATION: 97 %

## 2018-10-30 DIAGNOSIS — I10 ESSENTIAL HYPERTENSION: Primary | ICD-10-CM

## 2018-10-30 DIAGNOSIS — R73.9 HYPERGLYCEMIA: ICD-10-CM

## 2018-10-30 DIAGNOSIS — Z83.3 FAMILY HISTORY OF DIABETES MELLITUS (DM): ICD-10-CM

## 2018-10-30 DIAGNOSIS — E78.2 HYPERLIPEMIA, MIXED: ICD-10-CM

## 2018-10-30 PROCEDURE — 99214 OFFICE O/P EST MOD 30 MIN: CPT | Performed by: INTERNAL MEDICINE

## 2018-10-30 RX ORDER — ATENOLOL AND CHLORTHALIDONE TABLET 50; 25 MG/1; MG/1
1 TABLET ORAL DAILY
Qty: 30 TABLET | Refills: 3 | Status: SHIPPED | OUTPATIENT
Start: 2018-10-30 | End: 2019-05-01 | Stop reason: SDUPTHER

## 2019-05-01 ENCOUNTER — OFFICE VISIT (OUTPATIENT)
Dept: INTERNAL MEDICINE CLINIC | Age: 29
End: 2019-05-01
Payer: COMMERCIAL

## 2019-05-01 ENCOUNTER — HOSPITAL ENCOUNTER (OUTPATIENT)
Age: 29
Discharge: HOME | End: 2019-05-01
Payer: COMMERCIAL

## 2019-05-01 VITALS
RESPIRATION RATE: 16 BRPM | DIASTOLIC BLOOD PRESSURE: 80 MMHG | HEART RATE: 90 BPM | BODY MASS INDEX: 38.36 KG/M2 | WEIGHT: 315 LBS | OXYGEN SATURATION: 98 % | HEIGHT: 76 IN | SYSTOLIC BLOOD PRESSURE: 130 MMHG

## 2019-05-01 DIAGNOSIS — Z00.00 ROUTINE GENERAL MEDICAL EXAMINATION AT A HEALTH CARE FACILITY: Primary | ICD-10-CM

## 2019-05-01 DIAGNOSIS — R73.9 HYPERGLYCEMIA: ICD-10-CM

## 2019-05-01 DIAGNOSIS — E78.2 HYPERLIPEMIA, MIXED: ICD-10-CM

## 2019-05-01 DIAGNOSIS — I10 ESSENTIAL HYPERTENSION: ICD-10-CM

## 2019-05-01 LAB
ALBUMIN SERPL-MCNC: 4.3 GM/DL (ref 3.4–5)
ALP BLD-CCNC: 107 IU/L (ref 40–128)
ALT SERPL-CCNC: 31 U/L (ref 10–40)
ANION GAP SERPL CALCULATED.3IONS-SCNC: 10 MMOL/L (ref 4–16)
AST SERPL-CCNC: 27 IU/L (ref 15–37)
BASOPHILS ABSOLUTE: 0.1 K/CU MM
BASOPHILS RELATIVE PERCENT: 0.7 % (ref 0–1)
BILIRUB SERPL-MCNC: 0.3 MG/DL (ref 0–1)
BUN BLDV-MCNC: 11 MG/DL (ref 6–23)
CALCIUM SERPL-MCNC: 9.3 MG/DL (ref 8.3–10.6)
CHLORIDE BLD-SCNC: 102 MMOL/L (ref 99–110)
CHOLESTEROL: 173 MG/DL
CO2: 27 MMOL/L (ref 21–32)
CREAT SERPL-MCNC: 1.1 MG/DL (ref 0.9–1.3)
DIFFERENTIAL TYPE: ABNORMAL
EOSINOPHILS ABSOLUTE: 0.7 K/CU MM
EOSINOPHILS RELATIVE PERCENT: 8.3 % (ref 0–3)
ESTIMATED AVERAGE GLUCOSE: 120 MG/DL
GFR AFRICAN AMERICAN: >60 ML/MIN/1.73M2
GFR NON-AFRICAN AMERICAN: >60 ML/MIN/1.73M2
GLUCOSE BLD-MCNC: 95 MG/DL (ref 70–99)
HBA1C MFR BLD: 5.8 % (ref 4.2–6.3)
HCT VFR BLD CALC: 49.2 % (ref 42–52)
HDLC SERPL-MCNC: 36 MG/DL
HEMOGLOBIN: 15.6 GM/DL (ref 13.5–18)
IMMATURE NEUTROPHIL %: 0.5 % (ref 0–0.43)
LDL CHOLESTEROL DIRECT: 137 MG/DL
LYMPHOCYTES ABSOLUTE: 1.9 K/CU MM
LYMPHOCYTES RELATIVE PERCENT: 22.7 % (ref 24–44)
MCH RBC QN AUTO: 28.6 PG (ref 27–31)
MCHC RBC AUTO-ENTMCNC: 31.7 % (ref 32–36)
MCV RBC AUTO: 90.1 FL (ref 78–100)
MONOCYTES ABSOLUTE: 0.6 K/CU MM
MONOCYTES RELATIVE PERCENT: 7.3 % (ref 0–4)
NUCLEATED RBC %: 0 %
PDW BLD-RTO: 14.1 % (ref 11.7–14.9)
PLATELET # BLD: 359 K/CU MM (ref 140–440)
PMV BLD AUTO: 8.9 FL (ref 7.5–11.1)
POTASSIUM SERPL-SCNC: 4.3 MMOL/L (ref 3.5–5.1)
RBC # BLD: 5.46 M/CU MM (ref 4.6–6.2)
SEGMENTED NEUTROPHILS ABSOLUTE COUNT: 5.1 K/CU MM
SEGMENTED NEUTROPHILS RELATIVE PERCENT: 60.5 % (ref 36–66)
SODIUM BLD-SCNC: 139 MMOL/L (ref 135–145)
TOTAL IMMATURE NEUTOROPHIL: 0.04 K/CU MM
TOTAL NUCLEATED RBC: 0 K/CU MM
TOTAL PROTEIN: 6.6 GM/DL (ref 6.4–8.2)
TRIGL SERPL-MCNC: 109 MG/DL
TSH HIGH SENSITIVITY: 2.39 UIU/ML (ref 0.27–4.2)
WBC # BLD: 8.4 K/CU MM (ref 4–10.5)

## 2019-05-01 PROCEDURE — 80053 COMPREHEN METABOLIC PANEL: CPT

## 2019-05-01 PROCEDURE — 83036 HEMOGLOBIN GLYCOSYLATED A1C: CPT

## 2019-05-01 PROCEDURE — 84443 ASSAY THYROID STIM HORMONE: CPT

## 2019-05-01 PROCEDURE — 99395 PREV VISIT EST AGE 18-39: CPT | Performed by: INTERNAL MEDICINE

## 2019-05-01 PROCEDURE — 36415 COLL VENOUS BLD VENIPUNCTURE: CPT

## 2019-05-01 PROCEDURE — 85025 COMPLETE CBC W/AUTO DIFF WBC: CPT

## 2019-05-01 PROCEDURE — 80061 LIPID PANEL: CPT

## 2019-05-01 PROCEDURE — 83721 ASSAY OF BLOOD LIPOPROTEIN: CPT

## 2019-05-01 RX ORDER — ATENOLOL AND CHLORTHALIDONE TABLET 50; 25 MG/1; MG/1
1 TABLET ORAL DAILY
Qty: 30 TABLET | Refills: 11 | Status: SHIPPED | OUTPATIENT
Start: 2019-05-01 | End: 2019-06-24

## 2019-05-01 RX ORDER — AMLODIPINE BESYLATE 5 MG/1
5 TABLET ORAL DAILY
Qty: 30 TABLET | Refills: 11 | Status: SHIPPED | OUTPATIENT
Start: 2019-05-01 | End: 2020-07-31 | Stop reason: SDUPTHER

## 2019-05-01 ASSESSMENT — PATIENT HEALTH QUESTIONNAIRE - PHQ9
SUM OF ALL RESPONSES TO PHQ QUESTIONS 1-9: 0
SUM OF ALL RESPONSES TO PHQ9 QUESTIONS 1 & 2: 0
1. LITTLE INTEREST OR PLEASURE IN DOING THINGS: 0
DEPRESSION UNABLE TO ASSESS: FUNCTIONAL CAPACITY MOTIVATION LIMITS ACCURACY
2. FEELING DOWN, DEPRESSED OR HOPELESS: 0
SUM OF ALL RESPONSES TO PHQ QUESTIONS 1-9: 0

## 2019-05-01 NOTE — RESULT ENCOUNTER NOTE
Call pt, labs ok/sugars are normal and is not diabetic--- chol is better but remains borderline high.   Do rec to continue work w diet, exercise and also trying his best to lose another 10# if possible

## 2019-05-01 NOTE — PROGRESS NOTES
History and Physical      Miguel Chiu  YOB: 1990    Date of Service:  5/1/2019    Chief Complaint:   Miguel Chiu is a 33 y.o. male who presents for complete physical examination. HPI:   Been losing wt w diet and exercise, also packs lunch and eating out less. Hypertension: Stable. Denies CP, SOB, cough, visual changes, dizziness, palpitations or HA. Last glc high 2018 and w fhx of DM will screen a1c. Lipids:  Has history of high cholesterol, not on medication but trying to continue regular low fat diet and maintenance of weight, regular exercise. Wt Readings from Last 3 Encounters:   05/01/19 (!) 380 lb (172.4 kg)   10/30/18 (!) 393 lb (178.3 kg)   09/02/18 (!) 350 lb (158.8 kg)     BP Readings from Last 3 Encounters:   05/01/19 138/89   10/30/18 139/84   09/02/18 (!) 134/90       Patient Active Problem List   Diagnosis    Hypertension    Seborrheic dermatitis    Hyperpigmentation       Preventive Care:  Health Maintenance   Topic Date Due    Varicella Vaccine (1 of 2 - 13+ 2-dose series) 09/14/2004    HIV screen  09/14/2006    DTaP/Tdap/Td vaccine (1 - Tdap) 09/14/2010    Flu vaccine (Season Ended) 09/01/2019    HPV vaccine  Aged Out    Pneumococcal 0-64 years Vaccine  Aged Out        Lipid panel:    Lab Results   Component Value Date    TRIG 145 04/24/2018    HDL 36 04/24/2018    LDLCALC 140 04/24/2018         Immunization History   Administered Date(s) Administered    Influenza Virus Vaccine 11/16/2017       No Known Allergies  Current Outpatient Medications   Medication Sig Dispense Refill    atenolol-chlorthalidone (TENORETIC 50) 50-25 MG per tablet Take 1 tablet by mouth daily 30 tablet 3    amLODIPine (NORVASC) 5 MG tablet Take 1 tablet by mouth daily 30 tablet 11     No current facility-administered medications for this visit. Past Medical History:   Diagnosis Date    Hypertension      No past surgical history on file.   Family History oriented to person, place, and time. He appears well-developed and well-nourished. No distress. HEENT:   Head: Normocephalic and atraumatic. Nose: Nose normal.   Mouth/Throat: Oropharynx is clear and moist and mucous membranes are normal. No oropharyngeal exudate or posterior oropharyngeal erythema. He has no cervical adenopathy. Eyes: Conjunctivae and extraocular motions are normal. Pupils are equal, round, and reactive to light. Neck: Full passive range of motion without pain. Neck supple. No JVD present. No mass and no thyromegaly present. Cardiovascular: Normal rate, regular rhythm, normal heart sounds and intact distal pulses. Exam reveals no gallop and no friction rub. No murmur heard. Pulmonary/Chest: Effort normal and breath sounds normal. No respiratory distress. He has no wheezes, rhonchi or rales. Abdominal: Soft, non-tender. Bowel sounds and aorta are normal. There is no organomegaly, mass or bruit. Musculoskeletal: Normal range of motion, no synovitis. He exhibits no edema. Neurological: He is alert and oriented to person, place, and time. No cranial nerve deficit. Coordination normal.   Skin: Skin is warm, dry and intact. Psychiatric: He has a normal mood and affect. His speech is normal and behavior is normal. Judgment, cognition and memory are normal.         Assessment/Plan:    Wily Hartley was seen today for hypertension. Diagnoses and all orders for this visit:    Routine general medical examination at a health care facility- [address] DISCIPLINED DIET AND EXERCISE. Overall general medical exam appears benign, other assessments as noted and testing is as noted below. Essential hypertension - Blood pressure stable and will continue current regimen. Will plan periodic monitoring of renal function, electrolytes, lipid profile.     -     atenolol-chlorthalidone (TENORETIC 50) 50-25 MG per tablet;  Take 1 tablet by mouth daily  -     amLODIPine (NORVASC) 5 MG tablet; Take 1 tablet by mouth daily  -     Lipid Panel; Future  -     Comprehensive Metabolic Panel; Future  -     CBC Auto Differential; Future  -     TSH without Reflex; Future    Hyperglycemia - will continue to monitor fasting labs/glc for any progression to DM. Patient will continue to try to work on diet modification.    -     Hemoglobin A1C; Future    Hyperlipemia, mixed - Pt will continue to work on a low fat diet and also exercise, wt loss as appropriate. Will continue periodic monitoring of fasting lipid profile, glucose, liver function.    -     Lipid Panel; Future  -     Comprehensive Metabolic Panel; Future  -     CBC Auto Differential; Future  -     TSH without Reflex;  Future

## 2019-06-03 ENCOUNTER — HOSPITAL ENCOUNTER (EMERGENCY)
Age: 29
Discharge: HOME | End: 2019-06-03
Payer: COMMERCIAL

## 2019-06-03 ENCOUNTER — APPOINTMENT (OUTPATIENT)
Dept: GENERAL RADIOLOGY | Age: 29
End: 2019-06-03
Payer: COMMERCIAL

## 2019-06-03 VITALS
HEART RATE: 85 BPM | WEIGHT: 315 LBS | OXYGEN SATURATION: 96 % | TEMPERATURE: 98.7 F | RESPIRATION RATE: 10 BRPM | HEIGHT: 76 IN | SYSTOLIC BLOOD PRESSURE: 144 MMHG | DIASTOLIC BLOOD PRESSURE: 92 MMHG | BODY MASS INDEX: 38.36 KG/M2

## 2019-06-03 DIAGNOSIS — R42 LIGHTHEADEDNESS: ICD-10-CM

## 2019-06-03 DIAGNOSIS — R42 DIZZINESS: Primary | ICD-10-CM

## 2019-06-03 DIAGNOSIS — R09.81 NASAL CONGESTION: ICD-10-CM

## 2019-06-03 DIAGNOSIS — R03.0 ELEVATED BLOOD PRESSURE READING: ICD-10-CM

## 2019-06-03 LAB
ALBUMIN SERPL-MCNC: 4 GM/DL (ref 3.4–5)
ALP BLD-CCNC: 97 IU/L (ref 40–129)
ALT SERPL-CCNC: 21 U/L (ref 10–40)
ANION GAP SERPL CALCULATED.3IONS-SCNC: 8 MMOL/L (ref 4–16)
AST SERPL-CCNC: 15 IU/L (ref 15–37)
BASOPHILS ABSOLUTE: 0.1 K/CU MM
BASOPHILS RELATIVE PERCENT: 0.7 % (ref 0–1)
BILIRUB SERPL-MCNC: 0.2 MG/DL (ref 0–1)
BUN BLDV-MCNC: 11 MG/DL (ref 6–23)
CALCIUM SERPL-MCNC: 8.8 MG/DL (ref 8.3–10.6)
CHLORIDE BLD-SCNC: 104 MMOL/L (ref 99–110)
CO2: 25 MMOL/L (ref 21–32)
CREAT SERPL-MCNC: 1 MG/DL (ref 0.9–1.3)
DIFFERENTIAL TYPE: ABNORMAL
EOSINOPHILS ABSOLUTE: 0.6 K/CU MM
EOSINOPHILS RELATIVE PERCENT: 6.3 % (ref 0–3)
GFR AFRICAN AMERICAN: >60 ML/MIN/1.73M2
GFR NON-AFRICAN AMERICAN: >60 ML/MIN/1.73M2
GLUCOSE BLD-MCNC: 86 MG/DL (ref 70–99)
HCT VFR BLD CALC: 49.6 % (ref 42–52)
HEMOGLOBIN: 16 GM/DL (ref 13.5–18)
IMMATURE NEUTROPHIL %: 0.2 % (ref 0–0.43)
LYMPHOCYTES ABSOLUTE: 2 K/CU MM
LYMPHOCYTES RELATIVE PERCENT: 22.1 % (ref 24–44)
MCH RBC QN AUTO: 28.5 PG (ref 27–31)
MCHC RBC AUTO-ENTMCNC: 32.3 % (ref 32–36)
MCV RBC AUTO: 88.3 FL (ref 78–100)
MONOCYTES ABSOLUTE: 0.7 K/CU MM
MONOCYTES RELATIVE PERCENT: 7.6 % (ref 0–4)
NUCLEATED RBC %: 0 %
PDW BLD-RTO: 13.6 % (ref 11.7–14.9)
PLATELET # BLD: 402 K/CU MM (ref 140–440)
PMV BLD AUTO: 9.1 FL (ref 7.5–11.1)
POTASSIUM SERPL-SCNC: 3.9 MMOL/L (ref 3.5–5.1)
PRO-BNP: <5 PG/ML
RBC # BLD: 5.62 M/CU MM (ref 4.6–6.2)
REASON FOR REJECTION: NORMAL
REASON FOR REJECTION: NORMAL
REJECTED TEST: NORMAL
SEGMENTED NEUTROPHILS ABSOLUTE COUNT: 5.7 K/CU MM
SEGMENTED NEUTROPHILS RELATIVE PERCENT: 63.1 % (ref 36–66)
SODIUM BLD-SCNC: 137 MMOL/L (ref 135–145)
TOTAL IMMATURE NEUTOROPHIL: 0.02 K/CU MM
TOTAL NUCLEATED RBC: 0 K/CU MM
TOTAL PROTEIN: 7.5 GM/DL (ref 6.4–8.2)
TROPONIN T: <0.01 NG/ML
WBC # BLD: 9 K/CU MM (ref 4–10.5)

## 2019-06-03 PROCEDURE — 6370000000 HC RX 637 (ALT 250 FOR IP): Performed by: PHYSICIAN ASSISTANT

## 2019-06-03 PROCEDURE — 80053 COMPREHEN METABOLIC PANEL: CPT

## 2019-06-03 PROCEDURE — 93005 ELECTROCARDIOGRAM TRACING: CPT | Performed by: PHYSICIAN ASSISTANT

## 2019-06-03 PROCEDURE — 85025 COMPLETE CBC W/AUTO DIFF WBC: CPT

## 2019-06-03 PROCEDURE — 71045 X-RAY EXAM CHEST 1 VIEW: CPT

## 2019-06-03 PROCEDURE — 2580000003 HC RX 258: Performed by: PHYSICIAN ASSISTANT

## 2019-06-03 PROCEDURE — 83880 ASSAY OF NATRIURETIC PEPTIDE: CPT

## 2019-06-03 PROCEDURE — 99284 EMERGENCY DEPT VISIT MOD MDM: CPT

## 2019-06-03 PROCEDURE — 84484 ASSAY OF TROPONIN QUANT: CPT

## 2019-06-03 PROCEDURE — 36415 COLL VENOUS BLD VENIPUNCTURE: CPT

## 2019-06-03 RX ORDER — MECLIZINE HYDROCHLORIDE 25 MG/1
25 TABLET ORAL 3 TIMES DAILY PRN
Qty: 30 TABLET | Refills: 0 | Status: SHIPPED | OUTPATIENT
Start: 2019-06-03 | End: 2019-06-13

## 2019-06-03 RX ORDER — FLUTICASONE PROPIONATE 50 MCG
1 SPRAY, SUSPENSION (ML) NASAL 2 TIMES DAILY
Qty: 1 BOTTLE | Refills: 0 | Status: SHIPPED | OUTPATIENT
Start: 2019-06-03 | End: 2022-06-06

## 2019-06-03 RX ORDER — MECLIZINE HYDROCHLORIDE 25 MG/1
25 TABLET ORAL ONCE
Status: COMPLETED | OUTPATIENT
Start: 2019-06-03 | End: 2019-06-03

## 2019-06-03 RX ORDER — 0.9 % SODIUM CHLORIDE 0.9 %
1000 INTRAVENOUS SOLUTION INTRAVENOUS ONCE
Status: COMPLETED | OUTPATIENT
Start: 2019-06-03 | End: 2019-06-03

## 2019-06-03 RX ADMIN — SODIUM CHLORIDE 1000 ML: 9 INJECTION, SOLUTION INTRAVENOUS at 08:11

## 2019-06-03 RX ADMIN — MECLIZINE HYDROCHLORIDE 25 MG: 25 TABLET ORAL at 09:16

## 2019-06-03 NOTE — ED NOTES
[place] was redrawn on patient and urine was sent to lab on patient     Treva Man.  [facility]  06/03/19 [id number]

## 2019-06-03 NOTE — ED PROVIDER NOTES
eMERGENCY dEPARTMENT eNCOUnter      PCP: Jacque Brody MD    CHIEF COMPLAINT    Chief Complaint   Patient presents with    Dizziness     states heart begins racing after dizzy spell       HPI    Miguel Chiu is a 33 y.o. male who presents with concerns for dizzy spells. Patient states he started yesterday evening. Patient states that he was resting on the couch and would feel dizzy lightheaded like he could pass out of the room was spinning would last about a minute and then will resolve. Was not really associated with standing up or movement. Had a few separate episodes yesterday evening and then again this morning. When these are occurring patient denies any chest pain or shortness of breath. Denies any nausea vomiting diarrhea. Denies any recent changes in his medications. Has been on 2 blood pressure medications for at least 5 months. Currently denies any complaints but does have continual episodes that last about a minute long every once in a while, last this morning before he left to come in. Denies any fevers or chills. No chest pain or shortness of breath. No nausea vomiting diarrhea. Has had some nasal congestion last few days that he associates with allergies but has not been taking anything for his symptoms as allergy medications make him fatigued. Patient last took his blood pressure medication last night. Patient has been following regularly with his family doctor being worked up for obesity hypertension, early prediabetes, trialing blood pressure medications and dietary therapy. REVIEW OF SYSTEMS   Constitutional:  Denies fever, chills, weight loss or weakness   Neurologic:  See HPI. Denies confusion or memory loss. + light-headedness, dizziness, NO LOC. Denies stiff neck. Denies weakness or sensory changes   Eyes:   Denies discharge, diplopia, blurred vision, or loss visual field  HENT:  Denies sore throat or ear pain   GI  Denies abdominal pain.    No nausea, vomiting, or diarrhea. :  Denies Dysuria or Hematuria. Musculoskeletal:  Denies back pain. Skin:  Denies rash   Endocrine:  Denies polyuria or polydypsia   Lymphatic:  Denies swollen glands   Psychiatric:   Denies depression, suicidal ideation or homicidal ideation     All other review of systems negative at this time  See HPI and nursing notes for additional information      PAST MEDICAL & SURGICAL HISTORY    Past Medical History:   Diagnosis Date    Hypertension      History reviewed. No pertinent surgical history.     CURRENT MEDICATIONS    Current Outpatient Rx   Medication Sig Dispense Refill    fluticasone (FLONASE) 50 MCG/ACT nasal spray 1 spray by Nasal route 2 times daily 1 Bottle 0    meclizine (ANTIVERT) 25 MG tablet Take 1 tablet by mouth 3 times daily as needed for Dizziness 30 tablet 0    atenolol-chlorthalidone (TENORETIC 50) 50-25 MG per tablet Take 1 tablet by mouth daily 30 tablet 11    amLODIPine (NORVASC) 5 MG tablet Take 1 tablet by mouth daily 30 tablet 11       ALLERGIES    No Known Allergies    SOCIAL & FAMILY HISTORY    Social History     Socioeconomic History    Marital status: Single     Spouse name: None    Number of children: None    Years of education: None    Highest education level: None   Occupational History    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   Social Needs    Financial resource strain: None    Food insecurity:     Worry: None     Inability: None    Transportation needs:     Medical: None     Non-medical: None   Tobacco Use    Smoking status: Never Smoker    Smokeless tobacco: Never Used   Substance and Sexual Activity    Alcohol use: Yes     Comment: Occasional    Drug use: No    Sexual activity: None   Lifestyle    Physical activity:     Days per week: None     Minutes per session: None    Stress: None   Relationships    Social connections:     Talks on phone: None     Gets together: None     Attends [employer] service: None     Active member of club or organization: None     Attends meetings of clubs or organizations: None     Relationship status: None    Intimate partner violence:     Fear of current or ex partner: None     Emotionally abused: None     Physically abused: None     Forced sexual activity: None   Other Topics Concern    None   Social History Narrative    None     Family History   Problem Relation Age of Onset    Diabetes Mother     High Cholesterol Mother     Hypertension Mother     Diabetes Father     High Cholesterol Father     Hypertension Father        PHYSICAL EXAM    VITAL SIGNS: BP (!) 165/88   Pulse 88   Temp 98.7 °F (37.1 °C) (Oral)   Resp 20   Ht 6' 4\" (1.93 m)   Wt (!) 375 lb (170.1 kg)   SpO2 99%   BMI 45.65 kg/m²    Constitutional:  Well developed, well nourished, no acute distress. Afebrile. Elevated BMI    HENT:  Atraumatic. Eyes: Conjunctiva clear. No tearing . Pupils equally round and react to light, extraocular movement are intact. Funduscopic exam reveals red reflex intact without obvious retinal hemorrhages or defects. No obvious papilledema  Neck: supple, no JVD. Cardiovascular:  Reg rate & rhythm, no murmurs/rubs/gallops. Respiratory:  Lungs Clear, no retractions   GI:  Soft, nontender, normal bowel sounds  Musculoskeletal:  No edema, no deformities  Integument:  Well hydrated, no petechiae       Neurologic:    - Alert & oriented person, place, time, and situation, no speech difficulties or slurring.  - No obvious gross motor deficits  - Cranial nerves 2-12 grossly intact  - Sensation intact to light touch  - Strength 5/5 in upper and lower extremities bilaterally  - Negative Brudzinski's and Kernig's signs.  - Normal finger to nose test bilaterally  - Rapid alternating movements intact  - Normal heel-shin bilaterally  - No pronator drift. - Light touch sensation intact throughout. - Upper and lower extremity DTRs 2+ bilaterally.   - Gait steady and without difficulty      Psych: Pleasant affect, no hallucinations      LABS:   Results for orders placed or performed during the hospital encounter of 06/03/19   CBC auto diff   Result Value Ref Range    WBC 9.0 4.0 - 10.5 K/CU MM    RBC 5.62 4.6 - 6.2 M/CU MM    Hemoglobin 16.0 13.5 - 18.0 GM/DL    Hematocrit 49.6 42 - 52 %    MCV 88.3 78 - 100 FL    MCH 28.5 27 - 31 PG    MCHC 32.3 32.0 - 36.0 %    RDW 13.6 11.7 - 14.9 %    Platelets 618 962 - 660 K/CU MM    MPV 9.1 7.5 - 11.1 FL    Differential Type AUTOMATED DIFFERENTIAL     Segs Relative 63.1 36 - 66 %    Lymphocytes % 22.1 (L) 24 - 44 %    Monocytes % 7.6 (H) 0 - 4 %    Eosinophils % 6.3 (H) 0 - 3 %    Basophils % 0.7 0 - 1 %    Segs Absolute 5.7 K/CU MM    Lymphocytes # 2.0 K/CU MM    Monocytes # 0.7 K/CU MM    Eosinophils # 0.6 K/CU MM    Basophils # 0.1 K/CU MM    Nucleated RBC % 0.0 %    Total Nucleated RBC 0.0 K/CU MM    Total Immature Neutrophil 0.02 K/CU MM    Immature Neutrophil % 0.2 0 - 0.43 %   SPECIMEN REJECTION   Result Value Ref Range    Rejected Test CMPR PBNP TROT     Reason for Rejection UNABLE TO PERFORM TESTING:     Reason for Rejection SPECIMEN HEMOLYZED    Comprehensive Metabolic Panel   Result Value Ref Range    Sodium 137 135 - 145 MMOL/L    Potassium 3.9 3.5 - 5.1 MMOL/L    Chloride 104 99 - 110 mMol/L    CO2 25 21 - 32 MMOL/L    BUN 11 6 - 23 MG/DL    CREATININE 1.0 0.9 - 1.3 MG/DL    Glucose 86 70 - 99 MG/DL    Calcium 8.8 8.3 - 10.6 MG/DL    Alb 4.0 3.4 - 5.0 GM/DL    Total Protein 7.5 6.4 - 8.2 GM/DL    Total Bilirubin 0.2 0.0 - 1.0 MG/DL    ALT 21 10 - 40 U/L    AST 15 15 - 37 IU/L    Alkaline Phosphatase 97 40 - 129 IU/L    GFR Non-African American >60 >60 mL/min/1.73m2    GFR African American >60 >60 mL/min/1.73m2    Anion Gap 8 4 - 16   Brain Natriuretic Peptide   Result Value Ref Range    Pro-BNP <5 <300 PG/ML   Troponin   Result Value Ref Range    Troponin T <0.010 <0.01 NG/ML   EKG 12 Lead   Result Value Ref Range    Ventricular Rate 92 BPM    Atrial Rate 92 BPM    P-R Interval 196 ms    QRS Duration 98 ms    Q-T Interval 362 ms    QTc Calculation (Bazett) 447 ms    P Axis 34 degrees    R Axis 24 degrees    T Axis 18 degrees    Diagnosis       Normal sinus rhythm  Normal ECG  When compared with ECG of 02-SEP-2018 11:00,  Criteria for Septal infarct are no longer present             IMAGING:      XR CHEST PORTABLE (Final result)   Result time 06/03/19 08:09:05   Final result by Adina Blount MD (06/03/19 08:09:05)                Impression:    Normal chest.  No acute abnormality.  No change from the prior study. Narrative:    EXAMINATION:  ONE XRAY VIEW OF THE CHEST    6/3/2019 7:53 am    COMPARISON:  09/10/2008    HISTORY:  ORDERING SYSTEM PROVIDED HISTORY: dizziness  TECHNOLOGIST PROVIDED HISTORY:  Reason for exam:->dizziness  Ordering Physician Provided Reason for Exam: dizziness  Acuity: Acute  Type of Exam: Initial  Additional signs and symptoms: Pt states has been having dizzy spells that  come and go and when this occurs heart begins racing after. Also reports it  being difficult to walk when these spells occur    FINDINGS:  Heart size is normal.  The lungs are clear.  No adenopathy or pleural  effusion.                        ED COURSE & MEDICAL DECISION MAKING       Vital signs and nursing notes reviewed during ED course. I have independently evaluated this patient . Supervising MD present in the Emergency Department, available for consultation, throughout entirety of  patient care. Disposition and plan discussed at bedside with patient and/or the family today. All pertinent Lab data and radiographic results reviewed with patient at bedside. The patient and/or the family were informed of the results of any tests/labs/imaging, the treatment plan, and time was allotted to answer questions. Pt presents as above. Vitals today show the pt is afebrile. 99% on room air. Patient is hypertensive.     Labs and imaging ordered given symptomatic treatment, is not orthostatic with vitals are completed. Lab workup partially unremarkable. Chest x-ray unremarkable EKG without acute signs of ischemia. Her abnormality. Patient resting comfortably on recheck is having some improvement of his symptoms is only had one short 10 second episode of lightheaded dizziness. Ambulates here in the ED without difficulty. Discussed with him that his nasal congestion could be causing some of his symptoms does not appear vertiginous on exam.  Low suspicion for posterior stroke or acute neurologic cause the patient's symptoms low suspicion for cardiac cause of patient's symptoms. Patient has been closely following up with his family doctor regarding his blood pressure, has not had any hypotensive episodes. Discussed with patient trying symptomatically treatment with Flonase, urine use meclizine if it is helpful. Continuing to be hydrated. Follow up closely with PCP for reevaluation and return to the ED with any new or worsening symptoms. Low suspicion for ACS, PE, dissection. I estimate there is LOW risk for INTRACRANIAL HEMORRHAGE, ISCHEMIC CVA,  MENINGITIS, ACUTE CORONARY SYNDROME, MALIGNANT DYSRHYTHMIA, PULMONARY EMBOLISM, PNEUMONIA or SEPSIS, thus I consider the discharge disposition reasonable. Rl Kami (or their surrogate) and I have discussed the diagnosis and risks, and we agree with discharging home with close follow-up. We also discussed returning to the Emergency Department immediately if new or worsening symptoms occur. We have discussed the symptoms which are most concerning that necessitate immediate return. Clinical  IMPRESSION    1. Dizziness    2. Lightheadedness    3. Nasal congestion    4. Elevated blood pressure reading            Diagnosis and plan discussed in detail with patient who understands and agrees.   Return to emergency Department precautions, which included any change in nature or severity of headaches or any new symptoms, were discussed in detail with patient who understands and agrees. Comment: Please note this report has been produced using speech recognition software and may contain errors related to that system including errors in grammar, punctuation, and spelling, as well as words and phrases that may be inappropriate. If there are any questions or concerns please feel free to contact the dictating provider for clarification.         Estephania Riggs PA-C  06/03/19 [id number]

## 2019-06-03 NOTE — ED PROVIDER NOTES
12 lead EKG per my interpretation:  Normal Sinus Rhythm 92  Axis is   Normal  QTc is  447  There is no specific T wave changes appreciated. There is no specific ST wave changes appreciated.     Prior EKG to compare with was not available         Serena Diego DO  06/03/19 [id number]

## 2019-06-03 NOTE — ED NOTES
Pt states has been having dizzy spells that come and go and when this occurs heart begins racing after. Also reports it being difficult to walk when these spells occur.       Natalie Mckeon RN  06/03/19 [id number]

## 2019-06-05 PROCEDURE — 93010 ELECTROCARDIOGRAM REPORT: CPT | Performed by: INTERNAL MEDICINE

## 2019-06-06 LAB
EKG ATRIAL RATE: 92 BPM
EKG DIAGNOSIS: NORMAL
EKG P AXIS: 34 DEGREES
EKG P-R INTERVAL: 196 MS
EKG Q-T INTERVAL: 362 MS
EKG QRS DURATION: 98 MS
EKG QTC CALCULATION (BAZETT): 447 MS
EKG R AXIS: 24 DEGREES
EKG T AXIS: 18 DEGREES
EKG VENTRICULAR RATE: 92 BPM

## 2019-06-24 ENCOUNTER — OFFICE VISIT (OUTPATIENT)
Dept: INTERNAL MEDICINE CLINIC | Age: 29
End: 2019-06-24
Payer: COMMERCIAL

## 2019-06-24 VITALS
WEIGHT: 315 LBS | TEMPERATURE: 99 F | SYSTOLIC BLOOD PRESSURE: 138 MMHG | DIASTOLIC BLOOD PRESSURE: 84 MMHG | BODY MASS INDEX: 45.65 KG/M2 | HEART RATE: 82 BPM | RESPIRATION RATE: 18 BRPM | OXYGEN SATURATION: 98 %

## 2019-06-24 DIAGNOSIS — J01.00 ACUTE NON-RECURRENT MAXILLARY SINUSITIS: Primary | ICD-10-CM

## 2019-06-24 PROCEDURE — 99213 OFFICE O/P EST LOW 20 MIN: CPT | Performed by: INTERNAL MEDICINE

## 2019-06-24 RX ORDER — AMOXICILLIN AND CLAVULANATE POTASSIUM 875; 125 MG/1; MG/1
1 TABLET, FILM COATED ORAL 2 TIMES DAILY
Qty: 20 TABLET | Refills: 0 | Status: SHIPPED | OUTPATIENT
Start: 2019-06-24 | End: 2019-07-04

## 2019-06-24 RX ORDER — PREDNISONE 1 MG/1
TABLET ORAL
Qty: 21 TABLET | Refills: 0 | Status: SHIPPED | OUTPATIENT
Start: 2019-06-24 | End: 2020-07-31 | Stop reason: ALTCHOICE

## 2019-06-24 NOTE — PROGRESS NOTES
[place]  1990  06/24/19    SUBJECTIVE:    Two week hx of worsening sinus congestion and drainage, R sided headache and pressure. Low gr fever noted. No change w flonase. Had tr cough but now better. OBJECTIVE:    /84 (Site: Left Upper Arm, Position: Sitting, Cuff Size: Large Adult)   Pulse 82   Temp 99 °F (37.2 °C) (Oral)   Resp 18   Wt (!) 375 lb (170.1 kg)   SpO2 98%   BMI 45.65 kg/m²     Physical Exam   Constitutional: He appears well-developed and well-nourished. No distress. HENT:   Head: Normocephalic and atraumatic. Nose: Mucosal edema and rhinorrhea present. No nasal deformity. No epistaxis. Mouth/Throat: Oropharynx is clear and moist. No oropharyngeal exudate. Bilateral nasal congestion with clear discharge noted, RIGHT maxillary sinus tenderness   Eyes: Conjunctivae are normal. No scleral icterus. Neck: Neck supple. Cardiovascular: Normal rate, regular rhythm and normal heart sounds. No murmur heard. Pulmonary/Chest: Effort normal and breath sounds normal. No respiratory distress. He has no wheezes. He has no rales. Abdominal: Soft. Bowel sounds are normal. He exhibits no distension. There is no tenderness. Lymphadenopathy:     He has no cervical adenopathy. Vitals reviewed. ASSESSMENT:    1. Acute non-recurrent maxillary sinusitis        PLAN:    Munir Major was seen today for sinus problem. Diagnoses and all orders for this visit:    Acute non-recurrent maxillary sinusitis - Consistent w presentation, rec rx as below and fluids, rest.  Pt to call back one week if not improving.    -     amoxicillin-clavulanate (AUGMENTIN) 875-125 MG per tablet; Take 1 tablet by mouth 2 times daily for 10 days  -     predniSONE (DELTASONE) 5 MG tablet; Take 6 tablets by mouth on day 1, 5 on day 2, 4 on day 3, 3 on day 4, 2 on day 5, 1 on day 6.

## 2020-07-31 RX ORDER — AMLODIPINE BESYLATE 5 MG/1
5 TABLET ORAL DAILY
Qty: 30 TABLET | Refills: 0 | OUTPATIENT
Start: 2020-07-31 | End: 2020-08-05 | Stop reason: SDUPTHER

## 2020-08-05 ENCOUNTER — OFFICE VISIT (OUTPATIENT)
Dept: INTERNAL MEDICINE CLINIC | Age: 30
End: 2020-08-05
Payer: COMMERCIAL

## 2020-08-05 VITALS
HEART RATE: 100 BPM | OXYGEN SATURATION: 97 % | DIASTOLIC BLOOD PRESSURE: 86 MMHG | WEIGHT: 315 LBS | HEIGHT: 76 IN | SYSTOLIC BLOOD PRESSURE: 139 MMHG | RESPIRATION RATE: 17 BRPM | BODY MASS INDEX: 38.36 KG/M2

## 2020-08-05 LAB
A/G RATIO: 1.8 (ref 1.1–2.2)
ALBUMIN SERPL-MCNC: 4.6 G/DL (ref 3.4–5)
ALP BLD-CCNC: 126 U/L (ref 40–129)
ALT SERPL-CCNC: 24 U/L (ref 10–40)
ANION GAP SERPL CALCULATED.3IONS-SCNC: 12 MMOL/L (ref 3–16)
AST SERPL-CCNC: 20 U/L (ref 15–37)
BASOPHILS ABSOLUTE: 0.1 K/UL (ref 0–0.2)
BASOPHILS RELATIVE PERCENT: 1.3 %
BILIRUB SERPL-MCNC: 0.4 MG/DL (ref 0–1)
BUN BLDV-MCNC: 13 MG/DL (ref 7–20)
CALCIUM SERPL-MCNC: 9.6 MG/DL (ref 8.3–10.6)
CHLORIDE BLD-SCNC: 101 MMOL/L (ref 99–110)
CHOLESTEROL, TOTAL: 214 MG/DL (ref 0–199)
CO2: 27 MMOL/L (ref 21–32)
CREAT SERPL-MCNC: 1 MG/DL (ref 0.9–1.3)
EOSINOPHILS ABSOLUTE: 0.2 K/UL (ref 0–0.6)
EOSINOPHILS RELATIVE PERCENT: 2.4 %
GFR AFRICAN AMERICAN: >60
GFR NON-AFRICAN AMERICAN: >60
GLOBULIN: 2.6 G/DL
GLUCOSE BLD-MCNC: 91 MG/DL (ref 70–99)
HCT VFR BLD CALC: 49.3 % (ref 40.5–52.5)
HDLC SERPL-MCNC: 40 MG/DL (ref 40–60)
HEMOGLOBIN: 16.2 G/DL (ref 13.5–17.5)
LDL CHOLESTEROL CALCULATED: 150 MG/DL
LYMPHOCYTES ABSOLUTE: 1.9 K/UL (ref 1–5.1)
LYMPHOCYTES RELATIVE PERCENT: 29.5 %
MCH RBC QN AUTO: 28.9 PG (ref 26–34)
MCHC RBC AUTO-ENTMCNC: 33 G/DL (ref 31–36)
MCV RBC AUTO: 87.7 FL (ref 80–100)
MONOCYTES ABSOLUTE: 0.5 K/UL (ref 0–1.3)
MONOCYTES RELATIVE PERCENT: 7.5 %
NEUTROPHILS ABSOLUTE: 3.8 K/UL (ref 1.7–7.7)
NEUTROPHILS RELATIVE PERCENT: 59.3 %
PDW BLD-RTO: 14.3 % (ref 12.4–15.4)
PLATELET # BLD: 351 K/UL (ref 135–450)
PMV BLD AUTO: 7.9 FL (ref 5–10.5)
POTASSIUM SERPL-SCNC: 4.3 MMOL/L (ref 3.5–5.1)
RBC # BLD: 5.62 M/UL (ref 4.2–5.9)
SODIUM BLD-SCNC: 140 MMOL/L (ref 136–145)
TOTAL PROTEIN: 7.2 G/DL (ref 6.4–8.2)
TRIGL SERPL-MCNC: 119 MG/DL (ref 0–150)
TSH SERPL DL<=0.05 MIU/L-ACNC: 1.96 UIU/ML (ref 0.27–4.2)
VLDLC SERPL CALC-MCNC: 24 MG/DL
WBC # BLD: 6.5 K/UL (ref 4–11)

## 2020-08-05 PROCEDURE — 99395 PREV VISIT EST AGE 18-39: CPT | Performed by: INTERNAL MEDICINE

## 2020-08-05 PROCEDURE — 36415 COLL VENOUS BLD VENIPUNCTURE: CPT | Performed by: INTERNAL MEDICINE

## 2020-08-05 RX ORDER — AMLODIPINE BESYLATE 5 MG/1
5 TABLET ORAL DAILY
Qty: 90 TABLET | Refills: 3 | Status: SHIPPED | OUTPATIENT
Start: 2020-08-05 | End: 2022-06-06 | Stop reason: SDUPTHER

## 2020-08-05 ASSESSMENT — PATIENT HEALTH QUESTIONNAIRE - PHQ9
SUM OF ALL RESPONSES TO PHQ QUESTIONS 1-9: 0
1. LITTLE INTEREST OR PLEASURE IN DOING THINGS: 0
DEPRESSION UNABLE TO ASSESS: FUNCTIONAL CAPACITY MOTIVATION LIMITS ACCURACY
2. FEELING DOWN, DEPRESSED OR HOPELESS: 0
SUM OF ALL RESPONSES TO PHQ9 QUESTIONS 1 & 2: 0
SUM OF ALL RESPONSES TO PHQ QUESTIONS 1-9: 0

## 2020-08-05 NOTE — PATIENT INSTRUCTIONS
For exercise please consider at minimum the 7 minute workout phone ap, by [employer]     For more wt loss, suggest intermittent fasting--- meaning fasting for 16 hrs and an eating window of 8 hrs. Suggest eating from 11am to finishing by 7pm, only water in between. For your leg swelling, today and tomorrow apply an ice pack for 20min, 2-3x/day. Keep elevated also for 20min 2-3x/day, higher than heart level. Then by Friday can apply a heating pad up to 3x/day as needed for swelling. Call if swelling is no better by next week.

## 2020-08-05 NOTE — PROGRESS NOTES
History and Physical      Josephine Apurva  YOB: 1990    Date of Service:  8/5/2020    Chief Complaint:   Josephine Mixon is a 30 y.o. male who presents for complete physical examination. HPI:   Hypertension: Stable. Denies CP, SOB, cough, visual changes, dizziness, palpitations or HA. Bumped his L shin in the shower today now w some swelling, possible hematoma. Weight gain noted but has not been as regular w execise, gyms closed. Asked him to retry int fasting and also consider 7min workout. fhx of DM in mother and mat aunt.       Wt Readings from Last 3 Encounters:   08/05/20 (!) 398 lb (180.5 kg)   06/24/19 (!) 375 lb (170.1 kg)   06/03/19 (!) 375 lb (170.1 kg)     BP Readings from Last 3 Encounters:   08/05/20 139/86   06/24/19 138/84   06/03/19 (!) 144/92       Patient Active Problem List   Diagnosis    Hypertension    Seborrheic dermatitis    Hyperpigmentation       Preventive Care:  Health Maintenance   Topic Date Due    Varicella vaccine (1 of 2 - 2-dose childhood series) 09/14/1992    HIV screen  09/14/2006    DTaP/Tdap/Td vaccine (1 - Tdap) 09/14/2010    A1C test (Diabetic or Prediabetic)  05/01/2020    Flu vaccine (1) 09/01/2020    Hepatitis A vaccine  Aged Out    Hepatitis B vaccine  Aged Out    Hib vaccine  Aged Out    Meningococcal (ACWY) vaccine  Aged Out    Pneumococcal 0-64 years Vaccine  Aged Out        Lipid panel:    Lab Results   Component Value Date    TRIG 109 05/01/2019    HDL 36 05/01/2019    LDLCALC 140 04/24/2018    LDLDIRECT 137 05/01/2019       Immunization History   Administered Date(s) Administered    Influenza Virus Vaccine 11/16/2017       No Known Allergies  Current Outpatient Medications   Medication Sig Dispense Refill    amLODIPine (NORVASC) 5 MG tablet Take 1 tablet by mouth daily 30 tablet 0    fluticasone (FLONASE) 50 MCG/ACT nasal spray 1 spray by Nasal route 2 times daily 1 Bottle 0     No current facility-administered medications for this visit. Past Medical History:   Diagnosis Date    Hypertension      No past surgical history on file. 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   Social Needs    Financial resource strain: Not on file    Food insecurity     Worry: Not on file     Inability: Not on file   [employer] needs     Medical: Not on file     Non-medical: Not on file   Tobacco Use    Smoking status: Never Smoker    Smokeless tobacco: Never Used   Substance and Sexual Activity    Alcohol use: Yes     Comment: Occasional    Drug use: No    Sexual activity: Not on file   Lifestyle    Physical activity     Days per week: Not on file     Minutes per session: Not on file    Stress: Not on file   Relationships    Social connections     Talks on phone: Not on file     Gets together: Not on file     Attends Alevism service: Not on file     Active member of club or organization: Not on file     Attends meetings of clubs or organizations: Not on file     Relationship status: Not on file    Intimate partner violence     Fear of current or ex partner: Not on file     Emotionally abused: Not on file     Physically abused: Not on file     Forced sexual activity: Not on file   Other Topics Concern    Not on file   Social History Narrative    Not on file       Review of Systems:  A comprehensive review of systems was negative except for what was noted in the HPI.     Physical Exam:   Vitals:    08/05/20 1350   BP: 139/86   Pulse: 100   Resp: 17   SpO2: 97% Weight: (!) 398 lb (180.5 kg)   Height: 6' 4\" (1.93 m)     Body mass index is 48.45 kg/m². Constitutional: He is oriented to person, place, and time. He appears well-developed and well-nourished. No distress. HEENT:   Head: Normocephalic and atraumatic. Nose: Nose normal.   Mouth/Throat: Oropharynx is clear and moist and mucous membranes are normal. No oropharyngeal exudate or posterior oropharyngeal erythema. He has no cervical adenopathy. Eyes: Conjunctivae and extraocular motions are normal. Pupils are equal, round, and reactive to light. Neck: Full passive range of motion without pain. Neck supple. No JVD present. No mass and no thyromegaly present. Cardiovascular: Normal rate, regular rhythm, normal heart sounds and intact distal pulses. Exam reveals no gallop and no friction rub. No murmur heard. Pulmonary/Chest: Effort normal and breath sounds normal. No respiratory distress. He has no wheezes, rhonchi or rales. Abdominal: Soft, non-tender. Bowel sounds and aorta are normal. There is no organomegaly, mass or bruit. Musculoskeletal: Normal range of motion, no synovitis. He exhibits no edema. SMALL HEMATOMA NOTED VS SOFT TISSUE SWELLING L [place]  Neurological: He is alert and oriented to person, place, and time. He has normal reflexes. No cranial nerve deficit. Coordination normal.   Skin: Skin is warm, dry and intact. Psychiatric: He has a normal mood and affect. His speech is normal and behavior is normal. Judgment, cognition and memory are normal.         Assessment/Plan:    Zuly Murguia was seen today for annual exam.    Diagnoses and all orders for this visit:    Routine general medical examination at a health care facility  - Overall general medical exam appears benign, other assessments as noted and testing is as noted below. -     Comprehensive Metabolic Panel; Future  -     CBC Auto Differential; Future  -     Lipid Panel;  Future  -     TSH without Reflex; Future    Essential hypertension  - Blood pressure stable and will continue current regimen. Will plan periodic monitoring of renal function, electrolytes, lipid profile. -     amLODIPine (NORVASC) 5 MG tablet; Take 1 tablet by mouth daily  -     Comprehensive Metabolic Panel; Future  -     CBC Auto Differential; Future  -     Lipid Panel; Future    Hyperglycemia - CONT MONITOR FASTING LAB FOR INCR RISK- WT GAIN AND FHX, FOR DM. TO WORK ON DIET, WT LOSS, EXERCISE  -     Comprehensive Metabolic Panel; Future  -     Hemoglobin A1C; Future    Leg hematoma, left, sequela- REC ICE FOR TWO DAYS AND ELEVATION, THEN HEATING PAD, To call back one week if not improving.

## 2020-08-06 PROBLEM — R73.03 PREDIABETES: Status: ACTIVE | Noted: 2020-08-06

## 2020-08-06 LAB
ESTIMATED AVERAGE GLUCOSE: 131.2 MG/DL
HBA1C MFR BLD: 6.2 %

## 2020-08-06 NOTE — RESULT ENCOUNTER NOTE
Call pt, labs indicate nl thyroid fxn and blood counts, liver/kidney fxn but importantly, his sugar avg is higher and now strongly prediabetic, his level/hgb a1c has risen fr 5.8 to 6.2 and at 6.5 would test positive for diabetes. Very very important to get back on track w diet, try to lose at least 5# a month then recheck hgb a1c and BMP in 3 months, dx hyperglycemia    Also chol is significantly higher from 173--214, so also recheck lipid panel in three months, dx hyperlipidemia.

## 2020-11-30 ENCOUNTER — OFFICE VISIT (OUTPATIENT)
Dept: PRIMARY CARE CLINIC | Age: 30
End: 2020-11-30
Payer: COMMERCIAL

## 2020-11-30 ENCOUNTER — HOSPITAL ENCOUNTER (OUTPATIENT)
Age: 30
Setting detail: SPECIMEN
Discharge: HOME | End: 2020-11-30
Payer: COMMERCIAL

## 2020-11-30 VITALS — HEART RATE: 113 BPM | OXYGEN SATURATION: 95 % | TEMPERATURE: 98.4 F

## 2020-11-30 PROCEDURE — 99213 OFFICE O/P EST LOW 20 MIN: CPT | Performed by: NURSE PRACTITIONER

## 2020-11-30 PROCEDURE — U0002 COVID-19 LAB TEST NON-CDC: HCPCS

## 2020-11-30 ASSESSMENT — ENCOUNTER SYMPTOMS
NAUSEA: 0
COUGH: 0
CHEST TIGHTNESS: 0
WHEEZING: 0
TROUBLE SWALLOWING: 0
SORE THROAT: 1
SHORTNESS OF BREATH: 0

## 2020-11-30 NOTE — PROGRESS NOTES
Perez Euceda is a 35year old male who is in with 2 days of flu like symptoms. 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 He denies fevers, chills or sweats today. He states his symptoms started with fevers, chills, headache and sore throat. He states he has used some tylenol with some relief. Cher Joes Juanagusto  1990  35 y.o. SUBJECT TIFFANIE:    Chief Complaint   Patient presents with    Concern For COVID-19       HPI      Current Outpatient Medications on File Prior to Visit   Medication Sig Dispense Refill    amLODIPine (NORVASC) 5 MG tablet Take 1 tablet by mouth daily 90 tablet 3    fluticasone (FLONASE) 50 MCG/ACT nasal spray 1 spray by Nasal route 2 times daily 1 Bottle 0     No current facility-administered medications on file prior to visit. Past Medical History:   Diagnosis Date    Hypertension     Prediabetes 08/06/2020    hgb a1c 6.2. No past surgical history on file.   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   Social Needs    Financial resource strain: Not on file    Food insecurity     Worry: Not on file     Inability: Not on file   [employer] needs     Medical: Not on file     Non-medical: Not on file   Tobacco Use    Smoking status: Never Smoker    Smokeless tobacco: Never Used   Substance and Sexual Activity    Alcohol use: Yes     Comment: Occasional    Drug use: No    Sexual activity: Not on file   Lifestyle    Physical activity     Days per week: Not on file     Minutes per session: Not on file    Stress: Not on file   Relationships    Social connections     Talks on phone: Not on file     Gets together: Not on file     Attends Congregational service: Not on file     Active member of club or organization: Not on file     Attends meetings of clubs or organizations: Not on file     Relationship status: Not on file    Intimate partner violence     Fear of current or ex partner: Not on file     Emotionally abused: Not on file     Physically abused: Not on file     Forced sexual activity: Not on file   Other Topics Concern    Not on file   Social History Narrative    Not on file       Review of Systems   Constitutional: Negative for activity change, appetite change, chills, diaphoresis, fatigue, fever and unexpected weight change. HENT: Positive for sore throat. Negative for congestion, ear discharge, ear pain and trouble swallowing. Respiratory: Negative for cough, chest tightness, shortness of breath and wheezing. Cardiovascular: Negative for chest pain and palpitations. Gastrointestinal: Negative for nausea. Neurological: Positive for headaches. OBJECTIVE:     Pulse 113   Temp 98.4 °F (36.9 °C) (Temporal)   SpO2 95%     Physical Exam  Vitals signs reviewed. Constitutional:       General: He is not in acute distress. Appearance: Normal appearance. He is well-developed. He is obese. He is not ill-appearing or diaphoretic. HENT:      Head: Normocephalic and atraumatic. Right Ear: External ear normal. There is impacted cerumen. Left Ear: Tympanic membrane and external ear normal. There is no impacted cerumen. Nose: Nose normal. No congestion or rhinorrhea. Mouth/Throat:      Mouth: Mucous membranes are moist.      Pharynx: Oropharynx is clear. Eyes:      Conjunctiva/sclera: Conjunctivae normal.      Pupils: Pupils are equal, round, and reactive to light. Neck:      Musculoskeletal: Normal range of motion and neck supple. No neck rigidity. Cardiovascular:      Rate and Rhythm: Normal rate and regular rhythm. Heart sounds: Normal heart sounds. Pulmonary:      Effort: Pulmonary effort is normal.      Breath sounds: Normal breath sounds. Musculoskeletal: Normal range of motion. Lymphadenopathy:      Cervical: No cervical adenopathy. Skin:     General: Skin is warm and dry. Neurological:      Mental Status: He is alert and oriented to person, place, and time. Psychiatric:         Behavior: Behavior normal.         Thought Content: Thought content normal.         Judgment: Judgment normal.         No results found for requested labs within last 30 days. Hemoglobin A1C (%)   Date Value   08/05/2020 6.2     Microscopic Examination (no units)   Date Value   09/01/2016 Not Indicated     LDL Calculated (mg/dL)   Date Value   08/05/2020 150 (H)       Lab Results   Component Value Date    WBC 6.5 08/05/2020    WBC 9.0 06/03/2019    WBC 8.4 05/01/2019    NEUTROABS 3.8 08/05/2020    NEUTROABS 3.6 04/24/2018    NEUTROABS 3.3 09/01/2016    HGB 16.2 08/05/2020    HGB 16.0 06/03/2019    HGB 15.6 05/01/2019    HCT 49.3 08/05/2020    HCT 49.6 06/03/2019    HCT 49.2 05/01/2019    MCV 87.7 08/05/2020    MCV 88.3 06/03/2019    MCV 90.1 05/01/2019     08/05/2020     06/03/2019     05/01/2019    SEGSABS 5.7 06/03/2019    SEGSABS 5.1 05/01/2019    SEGSABS 4.4 09/02/2018    LYMPHSABS 1.9 08/05/2020    MONOSABS 0.5 08/05/2020    EOSABS 0.2 08/05/2020    BASOSABS 0.1 08/05/2020     Lab Results   Component Value Date    TSH 1.96 08/05/2020    TSHHS 2.390 05/01/2019     Lab Results   Component Value Date    LABALBU 4.6 08/05/2020    BILITOT 0.4 08/05/2020    AST 20 08/05/2020    ALT 24 08/05/2020    ALKPHOS 126 08/05/2020             No results found for this visit on 11/30/20. ASSESSMENT AND PLAN:     1. Fever and chills  - COVID-19 Ambulatory    2.  Sore throat  - COVID-19 Ambulatory    Increase fluids rest  Saline nasal spray as directed  Warm salt gargles for throat discomfort  Monitor temperature twice a day  Tylenol for fevers and/or discomfort. If symptoms are worse -Go to the ER. Follow up with your primary doctor    No follow-ups on file. Care discussed with patient. Patient educated on signs and symptoms of exacerbation and when to seek further medical attention. Advised to call for any problems, questions, or concerns. Patient verbalizes understanding and agrees with plan. Medications reviewed and reconciled. Continue current medications. Appropriate prescriptions are ordered. Risks and benefits of meds are discussed. After visit summary provided.

## 2020-11-30 NOTE — PROGRESS NOTES
11/30/20  Jory Brand  1990    FLU/COVID-19 CLINIC EVALUATION    HPI SYMPTOMS:     Employer: [employer] [place]    [x] Fevers  [x] Chills  [] Cough  [] Coughing up blood  [] Chest Congestion  [] Nasal Congestion  [] Feeling short of breath  [] Sometimes  [] Frequently  [] All the time  [] Chest pain  [x] Headaches  []Tolerable  [] Severe  [x] Sore throat  [] Muscle aches  [] Nausea  [] Vomiting  []Unable to keep fluids down  [] Diarrhea  []Severe    [] OTHER SYMPTOMS:      Symptom Duration:   [] 1  [x] 2   [] 3   [] 4    [] 5   [] 6   [] 7   [] 8   [] 9   [] 10   [] 11   [] 12   [] 13   [] 14   [] Longer than 14 days    Symptom course:   [] Worsening     [x] Stable     [] Improving    RISK FACTORS:    [] Pregnant or possibly pregnant  [] Age over 61  [] Diabetes  [] Heart disease  [] Asthma  [] COPD/Other chronic lung diseases  [] Active Cancer  [] On Chemotherapy  [] Taking oral steroids  [] History Lymphoma/Leukemia  [] Close contact with a lab confirmed COVID-19 patient within 14 days of symptom onset  [] History of travel from affected geographical areas within 14 days of symptom onset       VITALS:  There were no vitals filed for this visit. TESTS:    POCT FLU:  [] Positive     []Negative    ASSESSMENT:    [] Flu  [] Possible COVID-19  [] Strep    PLAN:    [] Discharge home with written instructions for:  [] Flu management  [] Possible COVID-19 infection with self-quarantine and management of symptoms  [] Follow-up with primary care physician or emergency department if worsens  [] Evaluation per physician/NP/PA in clinic  [] Sent to ER       An  electronic signature was used to authenticate this note.      --Manuela Aguilar MA on 11/30/2020 at 10:22 AM

## 2020-12-02 LAB
SARS-COV-2: DETECTED
SOURCE: ABNORMAL

## 2022-05-28 DIAGNOSIS — I10 ESSENTIAL HYPERTENSION: ICD-10-CM

## 2022-05-31 RX ORDER — AMLODIPINE BESYLATE 5 MG/1
TABLET ORAL
Qty: 90 TABLET | Refills: 3 | OUTPATIENT
Start: 2022-05-31

## 2022-06-06 ENCOUNTER — OFFICE VISIT (OUTPATIENT)
Dept: INTERNAL MEDICINE CLINIC | Age: 32
End: 2022-06-06
Payer: COMMERCIAL

## 2022-06-06 VITALS
DIASTOLIC BLOOD PRESSURE: 102 MMHG | HEIGHT: 76 IN | RESPIRATION RATE: 17 BRPM | SYSTOLIC BLOOD PRESSURE: 175 MMHG | BODY MASS INDEX: 38.36 KG/M2 | WEIGHT: 315 LBS | OXYGEN SATURATION: 97 % | HEART RATE: 98 BPM

## 2022-06-06 DIAGNOSIS — Z00.00 ROUTINE GENERAL MEDICAL EXAMINATION AT A HEALTH CARE FACILITY: Primary | ICD-10-CM

## 2022-06-06 DIAGNOSIS — Z00.00 ENCOUNTER FOR WELL ADULT EXAM WITHOUT ABNORMAL FINDINGS: ICD-10-CM

## 2022-06-06 DIAGNOSIS — I10 ESSENTIAL HYPERTENSION: ICD-10-CM

## 2022-06-06 PROCEDURE — 99395 PREV VISIT EST AGE 18-39: CPT | Performed by: INTERNAL MEDICINE

## 2022-06-06 RX ORDER — ATENOLOL AND CHLORTHALIDONE TABLET 50; 25 MG/1; MG/1
1 TABLET ORAL DAILY
Qty: 90 TABLET | Refills: 1 | Status: SHIPPED | OUTPATIENT
Start: 2022-06-06

## 2022-06-06 RX ORDER — AMLODIPINE BESYLATE 5 MG/1
5 TABLET ORAL DAILY
Qty: 90 TABLET | Refills: 1 | Status: SHIPPED | OUTPATIENT
Start: 2022-06-06

## 2022-06-06 ASSESSMENT — PATIENT HEALTH QUESTIONNAIRE - PHQ9
DEPRESSION UNABLE TO ASSESS: FUNCTIONAL CAPACITY MOTIVATION LIMITS ACCURACY
1. LITTLE INTEREST OR PLEASURE IN DOING THINGS: 0
SUM OF ALL RESPONSES TO PHQ QUESTIONS 1-9: 0
2. FEELING DOWN, DEPRESSED OR HOPELESS: 0
SUM OF ALL RESPONSES TO PHQ9 QUESTIONS 1 & 2: 0
SUM OF ALL RESPONSES TO PHQ QUESTIONS 1-9: 0

## 2022-06-06 NOTE — PROGRESS NOTES
Well Adult Note  Name: Isa Roque Date: 2022   MRN: [id number] Sex: Male   Age: 28 y.o. Ethnicity: Non- / Non    : 1990 Race: Black / African American      Kerri Salvador is here for well adult exam.  History:   presents for physical, is to start [employer] in the fall. Hx of HTN, no sx cp or sob. Has been on norvasc taking regularly. At home ~140/95 range so can be sl high. Hx of preDM and due for fasting lab. Review of Systems    No Known Allergies    Prior to Visit Medications    Medication Sig Taking? Authorizing Provider   amLODIPine (NORVASC) 5 MG tablet Take 1 tablet by mouth daily Yes Samuel Romeo MD       Past Medical History:   Diagnosis Date    Hypertension     Prediabetes 2020    hgb a1c 6.2. No past surgical history on file. Family History   Problem Relation Age of Onset    Diabetes Mother     High Cholesterol Mother     Hypertension Mother     Diabetes Father     High Cholesterol Father     Hypertension Father        Social History     Tobacco Use    Smoking status: Never Smoker    Smokeless tobacco: Never Used   Substance Use Topics    Alcohol use: Yes     Comment: Occasional    Drug use: No       Objective   BP (!) 178/109   Pulse 98   Resp 17   Ht 6' 4\" (1.93 m)   Wt (!) 407 lb (184.6 kg)   SpO2 97%   BMI 49.54 kg/m²   Wt Readings from Last 3 Encounters:   22 (!) 407 lb (184.6 kg)   20 (!) 398 lb (180.5 kg)   19 (!) 375 lb (170.1 kg)      RECHECK BP R ARM /100. Physical Exam  Vitals reviewed. Constitutional:       General: He is not in acute distress. Appearance: He is well-developed. HENT:      Head: Normocephalic and atraumatic. Right Ear: External ear normal.      Left Ear: External ear normal.      Nose: Congestion present. Mouth/Throat:      Mouth: Mucous membranes are moist.      Pharynx: Oropharynx is clear. No oropharyngeal exudate.    Eyes: General: No scleral icterus. Right eye: No discharge. Left eye: No discharge. Conjunctiva/sclera: Conjunctivae normal.      Pupils: Pupils are equal, round, and reactive to light. Neck:      Thyroid: No thyromegaly. Vascular: No JVD. Trachea: No tracheal deviation. Cardiovascular:      Rate and Rhythm: Normal rate and regular rhythm. Heart sounds: Normal heart sounds. No murmur heard. No friction rub. No gallop. Pulmonary:      Effort: Pulmonary effort is normal. No respiratory distress. Breath sounds: Normal breath sounds. No wheezing or rales. Abdominal:      General: Bowel sounds are normal. There is no distension. Palpations: Abdomen is soft. There is no mass. Tenderness: There is no abdominal tenderness. There is no guarding or rebound. Musculoskeletal:         General: No tenderness. Cervical back: Normal range of motion and neck supple. Lymphadenopathy:      Cervical: No cervical adenopathy. Skin:     General: Skin is warm and dry. Findings: No rash. Neurological:      Mental Status: He is alert. Psychiatric:         Mood and Affect: Mood normal.           Assessment   Plan   1. Routine general medical examination at a health care facility - Overall general medical exam appears benign, other assessments as noted and testing is as noted below. -     Comprehensive Metabolic Panel; Future  -     CBC with Auto Differential; Future  -     Lipid Panel; Future  -     TSH; Future  -     Urinalysis with Microscopic; Future  2. 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 We'll add tenoretic, then recheck bp one week and also f/u fasting lab. He'll work on diet, exercise and wt loss  -     amLODIPine (NORVASC) 5 MG tablet; Take 1 tablet by mouth daily, Disp-90 tablet, R-1Normal  -     atenolol-chlorthalidone (TENORETIC 50) 50-25 MG per tablet;  Take 1 tablet by mouth daily, Disp-90 tablet, R-1Normal  -     Comprehensive Metabolic Panel; Future  -     CBC with Auto Differential; Future  -     Lipid Panel; Future  -     TSH; Future  -     Urinalysis with Microscopic; Future  3. Encounter for well adult exam without abnormal findings         Personalized Preventive Plan   Current Health Maintenance Status  Immunization History   Administered Date(s) Administered    COVID-19, Pfizer Purple top, DILUTE for use, 12+ yrs, 30mcg/0.3mL dose 03/24/2021, 04/14/2021    Influenza Virus Vaccine 11/16/2017        Health Maintenance   Topic Date Due    DTaP/Tdap/Td vaccine (6 - Tdap) 07/30/2003    HIV screen  Never done    Hepatitis B vaccine (3 of 3 - 3-dose primary series) 07/30/2008    Hepatitis A vaccine (2 of 2 - 2-dose series) 10/09/2008    Hepatitis C screen  Never done    A1C test (Diabetic or Prediabetic)  08/05/2021    Depression Screen  08/05/2021    COVID-19 Vaccine (3 - Booster for Pfizer series) 09/14/2021    Hib vaccine  Completed    Varicella vaccine  Completed    Meningococcal (ACWY) vaccine  Completed    Flu vaccine  Completed    Pneumococcal 0-64 years Vaccine  Aged Out     Recommendations for [employer] Due: see orders and patient instructions/AVS.    No follow-ups on file.

## 2022-06-13 DIAGNOSIS — Z00.00 ROUTINE GENERAL MEDICAL EXAMINATION AT A HEALTH CARE FACILITY: ICD-10-CM

## 2022-06-13 DIAGNOSIS — I10 ESSENTIAL HYPERTENSION: ICD-10-CM

## 2022-06-13 LAB
A/G RATIO: 2.1 (ref 1.1–2.2)
ALBUMIN SERPL-MCNC: 5.3 G/DL (ref 3.4–5)
ALP BLD-CCNC: 159 U/L (ref 40–129)
ALT SERPL-CCNC: 19 U/L (ref 10–40)
ANION GAP SERPL CALCULATED.3IONS-SCNC: 16 MMOL/L (ref 3–16)
AST SERPL-CCNC: 19 U/L (ref 15–37)
BACTERIA: ABNORMAL /HPF
BASOPHILS ABSOLUTE: 0.1 K/UL (ref 0–0.2)
BASOPHILS RELATIVE PERCENT: 0.9 %
BILIRUB SERPL-MCNC: 0.7 MG/DL (ref 0–1)
BILIRUBIN URINE: NEGATIVE
BLOOD, URINE: NEGATIVE
BUN BLDV-MCNC: 13 MG/DL (ref 7–20)
CALCIUM SERPL-MCNC: 10.1 MG/DL (ref 8.3–10.6)
CHLORIDE BLD-SCNC: 100 MMOL/L (ref 99–110)
CHOLESTEROL, TOTAL: 214 MG/DL (ref 0–199)
CLARITY: CLEAR
CO2: 23 MMOL/L (ref 21–32)
COLOR: YELLOW
COMMENT UA: ABNORMAL
CREAT SERPL-MCNC: 1.1 MG/DL (ref 0.9–1.3)
EOSINOPHILS ABSOLUTE: 0 K/UL (ref 0–0.6)
EOSINOPHILS RELATIVE PERCENT: 0.4 %
EPITHELIAL CELLS, UA: 0 /HPF (ref 0–5)
GFR AFRICAN AMERICAN: >60
GFR NON-AFRICAN AMERICAN: >60
GLUCOSE BLD-MCNC: 116 MG/DL (ref 70–99)
GLUCOSE URINE: NEGATIVE MG/DL
HCT VFR BLD CALC: 46.9 % (ref 40.5–52.5)
HDLC SERPL-MCNC: 39 MG/DL (ref 40–60)
HEMOGLOBIN: 16.1 G/DL (ref 13.5–17.5)
HYALINE CASTS: 0 /LPF (ref 0–8)
KETONES, URINE: ABNORMAL MG/DL
LDL CHOLESTEROL CALCULATED: 157 MG/DL
LEUKOCYTE ESTERASE, URINE: NEGATIVE
LYMPHOCYTES ABSOLUTE: 2 K/UL (ref 1–5.1)
LYMPHOCYTES RELATIVE PERCENT: 24.3 %
MCH RBC QN AUTO: 29.2 PG (ref 26–34)
MCHC RBC AUTO-ENTMCNC: 34.4 G/DL (ref 31–36)
MCV RBC AUTO: 85 FL (ref 80–100)
MICROSCOPIC EXAMINATION: YES
MONOCYTES ABSOLUTE: 0.6 K/UL (ref 0–1.3)
MONOCYTES RELATIVE PERCENT: 6.9 %
MUCUS: PRESENT
NEUTROPHILS ABSOLUTE: 5.5 K/UL (ref 1.7–7.7)
NEUTROPHILS RELATIVE PERCENT: 67.5 %
NITRITE, URINE: NEGATIVE
PDW BLD-RTO: 14.7 % (ref 12.4–15.4)
PH UA: 6.5 (ref 5–8)
PLATELET # BLD: 374 K/UL (ref 135–450)
PMV BLD AUTO: 7.8 FL (ref 5–10.5)
POTASSIUM SERPL-SCNC: 3.8 MMOL/L (ref 3.5–5.1)
PROTEIN UA: 30 MG/DL
RBC # BLD: 5.52 M/UL (ref 4.2–5.9)
RBC UA: ABNORMAL /HPF (ref 0–4)
SODIUM BLD-SCNC: 139 MMOL/L (ref 136–145)
SPECIFIC GRAVITY UA: 1.02 (ref 1–1.03)
TOTAL PROTEIN: 7.8 G/DL (ref 6.4–8.2)
TRIGL SERPL-MCNC: 91 MG/DL (ref 0–150)
TSH SERPL DL<=0.05 MIU/L-ACNC: 1.86 UIU/ML (ref 0.27–4.2)
URINE TYPE: ABNORMAL
UROBILINOGEN, URINE: 1 E.U./DL
VLDLC SERPL CALC-MCNC: 18 MG/DL
WBC # BLD: 8.2 K/UL (ref 4–11)
WBC UA: 1 /HPF (ref 0–5)

## 2022-06-13 PROCEDURE — 36415 COLL VENOUS BLD VENIPUNCTURE: CPT | Performed by: INTERNAL MEDICINE

## 2022-06-14 DIAGNOSIS — E78.5 HYPERLIPIDEMIA, UNSPECIFIED HYPERLIPIDEMIA TYPE: ICD-10-CM

## 2022-06-14 DIAGNOSIS — R73.9 HYPERGLYCEMIA: Primary | ICD-10-CM

## 2022-06-14 DIAGNOSIS — R80.9 PROTEINURIA, UNSPECIFIED TYPE: ICD-10-CM

## 2022-07-15 ENCOUNTER — OFFICE VISIT (OUTPATIENT)
Dept: INTERNAL MEDICINE CLINIC | Age: 32
End: 2022-07-15
Payer: COMMERCIAL

## 2022-07-15 VITALS
HEART RATE: 88 BPM | OXYGEN SATURATION: 97 % | BODY MASS INDEX: 49.79 KG/M2 | SYSTOLIC BLOOD PRESSURE: 126 MMHG | WEIGHT: 315 LBS | DIASTOLIC BLOOD PRESSURE: 78 MMHG | RESPIRATION RATE: 15 BRPM

## 2022-07-15 DIAGNOSIS — R80.9 PROTEINURIA, UNSPECIFIED TYPE: ICD-10-CM

## 2022-07-15 DIAGNOSIS — I10 ESSENTIAL HYPERTENSION: Primary | ICD-10-CM

## 2022-07-15 DIAGNOSIS — Z23 NEED FOR DIPHTHERIA-TETANUS-PERTUSSIS (TDAP) VACCINE: ICD-10-CM

## 2022-07-15 DIAGNOSIS — E78.5 HYPERLIPIDEMIA, UNSPECIFIED HYPERLIPIDEMIA TYPE: ICD-10-CM

## 2022-07-15 PROCEDURE — 90715 TDAP VACCINE 7 YRS/> IM: CPT | Performed by: INTERNAL MEDICINE

## 2022-07-15 PROCEDURE — 99214 OFFICE O/P EST MOD 30 MIN: CPT | Performed by: INTERNAL MEDICINE

## 2022-07-15 PROCEDURE — 90471 IMMUNIZATION ADMIN: CPT | Performed by: INTERNAL MEDICINE

## 2022-07-15 RX ORDER — ATORVASTATIN CALCIUM 10 MG/1
10 TABLET, FILM COATED ORAL DAILY
Qty: 90 TABLET | Refills: 1 | Status: SHIPPED | OUTPATIENT
Start: 2022-07-15 | End: 2022-09-26

## 2022-07-15 NOTE — PROGRESS NOTES
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    Hypertension: Stable. Denies CP, SOB, cough, visual changes, dizziness, palpitations or HA. Morbid obesity, did advise for more aggressive work w diet and exercise to lose wt. Sl high and rising chol noted past few yrs and we discussed trying lipitor and working on diet, exercise. Also tr protein noted in   urine. Bp is now improved, we'll scr for urine microalb. OBJECTIVE:    /78   Pulse 88   Resp 15   Wt (!) 409 lb (185.5 kg)   SpO2 97%   BMI 49.79 kg/m²     Physical Exam  Vitals reviewed. Constitutional:       Appearance: He is well-developed. Cardiovascular:      Rate and Rhythm: Normal rate and regular rhythm. Heart sounds: Normal heart sounds. No murmur heard. No friction rub. No gallop. Pulmonary:      Effort: Pulmonary effort is normal. No respiratory distress. Breath sounds: Normal breath sounds. No wheezing or rales. Abdominal:      General: Bowel sounds are normal. There is no distension. Palpations: Abdomen is soft. Tenderness: There is no abdominal tenderness. Musculoskeletal:      Cervical back: Neck supple. Right lower leg: No edema. Left lower leg: No edema. Neurological:      Mental Status: He is alert. ASSESSMENT:    1. Essential hypertension    2. Hyperlipidemia, unspecified hyperlipidemia type    3. Proteinuria, unspecified type    4. Need for diphtheria-tetanus-pertussis (Tdap) vaccine        PLAN:    Jose G Medrano was seen today for hypertension, eczema and immunizations. Diagnoses and all orders for this visit:    Essential hypertension- Blood pressure stable and will continue current regimen. Will plan periodic monitoring of renal function, electrolytes, lipid profile. -     Comprehensive Metabolic Panel; Future  -     CBC with Auto Differential; Future  -     Lipid Panel;  Future  -     Microalbumin / Creatinine Urine Ratio; Future  -     Urinalysis with Microscopic; Future    Hyperlipidemia, unspecified hyperlipidemia type- to start trial lipitor then f/u lab in 6-8 weeks. -     atorvastatin (LIPITOR) 10 MG tablet; Take 1 tablet by mouth in the morning.  -     Comprehensive Metabolic Panel; Future  -     CBC with Auto Differential; Future  -     Lipid Panel; Future    Proteinuria, unspecified type- recheck urine studies to see if any significant proteinuria. Am hoping w normalization of bp this will resolve  -     Comprehensive Metabolic Panel; Future  -     CBC with Auto Differential; Future  -     Lipid Panel; Future  -     Microalbumin / Creatinine Urine Ratio; Future  -     Urinalysis with Microscopic;  Future    Need for diphtheria-tetanus-pertussis (Tdap) vaccine  -     Tdap, BOOSTRIX, (age 8 yrs+), IM

## 2022-09-26 ENCOUNTER — OFFICE VISIT (OUTPATIENT)
Dept: INTERNAL MEDICINE CLINIC | Age: 32
End: 2022-09-26
Payer: COMMERCIAL

## 2022-09-26 VITALS
OXYGEN SATURATION: 97 % | DIASTOLIC BLOOD PRESSURE: 72 MMHG | HEART RATE: 89 BPM | RESPIRATION RATE: 17 BRPM | SYSTOLIC BLOOD PRESSURE: 132 MMHG | WEIGHT: 315 LBS | BODY MASS INDEX: 51.25 KG/M2

## 2022-09-26 DIAGNOSIS — I10 ESSENTIAL HYPERTENSION: ICD-10-CM

## 2022-09-26 DIAGNOSIS — Z23 NEED FOR INFLUENZA VACCINATION: ICD-10-CM

## 2022-09-26 DIAGNOSIS — E78.5 HYPERLIPIDEMIA, UNSPECIFIED HYPERLIPIDEMIA TYPE: ICD-10-CM

## 2022-09-26 DIAGNOSIS — M54.31 RIGHT SIDED SCIATICA: Primary | ICD-10-CM

## 2022-09-26 PROCEDURE — 90471 IMMUNIZATION ADMIN: CPT | Performed by: INTERNAL MEDICINE

## 2022-09-26 PROCEDURE — 90674 CCIIV4 VAC NO PRSV 0.5 ML IM: CPT | Performed by: INTERNAL MEDICINE

## 2022-09-26 PROCEDURE — 99213 OFFICE O/P EST LOW 20 MIN: CPT | Performed by: INTERNAL MEDICINE

## 2022-09-26 RX ORDER — PREDNISONE 1 MG/1
TABLET ORAL
Qty: 36 TABLET | Refills: 0 | Status: SHIPPED | OUTPATIENT
Start: 2022-09-26

## 2022-09-26 RX ORDER — GABAPENTIN 300 MG/1
300 CAPSULE ORAL 2 TIMES DAILY PRN
Qty: 40 CAPSULE | Refills: 0 | Status: SHIPPED | OUTPATIENT
Start: 2022-09-26 | End: 2022-10-16

## 2022-09-26 RX ORDER — ROSUVASTATIN CALCIUM 5 MG/1
5 TABLET, COATED ORAL NIGHTLY
Qty: 90 TABLET | Refills: 1 | Status: SHIPPED | OUTPATIENT
Start: 2022-09-26

## 2022-09-26 NOTE — PROGRESS NOTES
Cipriano Sequeira  1990 09/26/22    SUBJECTIVE:    Hypertension: Stable. Denies CP, SOB, cough, visual changes, dizziness, palpitations or HA. Lipids, some abd bloating and sweats    He sprained his back w lifting at home and at the gym, some pain rad to R leg the past two weeks. No pain w cough or sneezing but incr pain w bending. OBJECTIVE:    /72   Pulse 89   Resp 17   Wt (!) 421 lb (191 kg)   SpO2 97%   BMI 51.25 kg/m²     Physical Exam  Constitutional:       Appearance: Normal appearance. Cardiovascular:      Rate and Rhythm: Normal rate and regular rhythm. Heart sounds: No murmur heard. No gallop. Pulmonary:      Effort: No respiratory distress. Breath sounds: No wheezing. Abdominal:      General: Abdomen is flat. Bowel sounds are normal. There is no distension. Palpations: Abdomen is soft. There is no mass. Tenderness: There is no abdominal tenderness. Hernia: No hernia is present. Musculoskeletal:      Right lower leg: No edema. Left lower leg: No edema. Neurological:      Mental Status: He is alert. Psychiatric:         Mood and Affect: Mood normal.       ASSESSMENT:    1. Right sided sciatica    2. Essential hypertension    3. Hyperlipidemia, unspecified hyperlipidemia type    4. Need for influenza vaccination        PLAN:    Joseph Mckinney was seen today for back pain and hip pain. Diagnoses and all orders for this visit:    Right sided sciatica- HE'LL AVOID HEAVY LIFTING FOR THE NEXT WEEK OR TO, REST AND TRY PRED TAPER, PRN NEURONTIN BUT IF SX PERSIST THEN LATER CONSIDER PT EVAL AND/OR IMAGING  -     predniSONE (DELTASONE) 5 MG tablet; Take 8 pills, then 7,6,5,4,3,2,1  -     gabapentin (NEURONTIN) 300 MG capsule; Take 1 capsule by mouth 2 times daily as needed (back pain and sciatica) for up to 20 days. Essential hypertension - The current medical regimen is effective;  continue present plan and medications.       Hyperlipidemia, unspecified hyperlipidemia type- SOME GI UPSET AND ? POSSIBLE SWEATS? ON LIPITOR, STOPPING THIS THEN IF LATER IMPROVES TRIAL OF CRESTOR INSTEAD IN TWO WEEKS. -     rosuvastatin (CRESTOR) 5 MG tablet;  Take 1 tablet by mouth nightly    Need for influenza vaccination  -     Influenza, FLUCELVAX, (age 10 mo+), IM, Preservative Free, 0.5 mL

## 2022-09-26 NOTE — PATIENT INSTRUCTIONS
Stop lipitor/atorvastatin, then start new med rosuvastatin/crestor in two weeks. For your hip and back pain, sciatica we'll try a prednisone taper for 8d, also try neurontin up to twice/day as needed for pain.

## 2022-10-31 ENCOUNTER — TELEPHONE (OUTPATIENT)
Dept: INTERNAL MEDICINE CLINIC | Age: 32
End: 2022-10-31

## 2022-10-31 DIAGNOSIS — M54.31 RIGHT SIDED SCIATICA: Primary | ICD-10-CM

## 2022-10-31 NOTE — TELEPHONE ENCOUNTER
Patient calls stating he is still having lower back pain. He was seen 9/26 for back pain and R hip pain. He states the hip pain is gone but the lower back pain has not improved at all. Please advise.

## 2022-11-21 ENCOUNTER — HOSPITAL ENCOUNTER (OUTPATIENT)
Dept: PHYSICAL THERAPY | Age: 32
Setting detail: THERAPIES SERIES
Discharge: HOME | End: 2022-11-21

## 2022-11-28 ENCOUNTER — OFFICE VISIT (OUTPATIENT)
Dept: INTERNAL MEDICINE CLINIC | Age: 32
End: 2022-11-28
Payer: COMMERCIAL

## 2022-11-28 VITALS
OXYGEN SATURATION: 98 % | HEART RATE: 107 BPM | HEIGHT: 76 IN | WEIGHT: 315 LBS | DIASTOLIC BLOOD PRESSURE: 89 MMHG | RESPIRATION RATE: 22 BRPM | SYSTOLIC BLOOD PRESSURE: 139 MMHG | BODY MASS INDEX: 38.36 KG/M2

## 2022-11-28 DIAGNOSIS — J01.00 ACUTE NON-RECURRENT MAXILLARY SINUSITIS: Primary | ICD-10-CM

## 2022-11-28 DIAGNOSIS — H10.9 CONJUNCTIVITIS OF RIGHT EYE, UNSPECIFIED CONJUNCTIVITIS TYPE: ICD-10-CM

## 2022-11-28 PROCEDURE — 99214 OFFICE O/P EST MOD 30 MIN: CPT | Performed by: NURSE PRACTITIONER

## 2022-11-28 PROCEDURE — 3074F SYST BP LT 130 MM HG: CPT | Performed by: NURSE PRACTITIONER

## 2022-11-28 PROCEDURE — 3078F DIAST BP <80 MM HG: CPT | Performed by: NURSE PRACTITIONER

## 2022-11-28 RX ORDER — POLYMYXIN B SULFATE AND TRIMETHOPRIM 1; 10000 MG/ML; [USP'U]/ML
1 SOLUTION OPHTHALMIC EVERY 4 HOURS
Qty: 1 EACH | Refills: 0 | Status: SHIPPED | OUTPATIENT
Start: 2022-11-28 | End: 2022-12-08

## 2022-11-28 RX ORDER — AMOXICILLIN AND CLAVULANATE POTASSIUM 875; 125 MG/1; MG/1
1 TABLET, FILM COATED ORAL 2 TIMES DAILY
Qty: 14 TABLET | Refills: 0 | Status: SHIPPED | OUTPATIENT
Start: 2022-11-28 | End: 2022-12-05

## 2022-11-28 RX ORDER — PREDNISONE 10 MG/1
TABLET ORAL
Qty: 20 TABLET | Refills: 0 | Status: SHIPPED | OUTPATIENT
Start: 2022-11-28

## 2022-11-28 SDOH — ECONOMIC STABILITY: FOOD INSECURITY: WITHIN THE PAST 12 MONTHS, THE FOOD YOU BOUGHT JUST DIDN'T LAST AND YOU DIDN'T HAVE MONEY TO GET MORE.: NEVER TRUE

## 2022-11-28 SDOH — ECONOMIC STABILITY: FOOD INSECURITY: WITHIN THE PAST 12 MONTHS, YOU WORRIED THAT YOUR FOOD WOULD RUN OUT BEFORE YOU GOT MONEY TO BUY MORE.: NEVER TRUE

## 2022-11-28 ASSESSMENT — ENCOUNTER SYMPTOMS
WHEEZING: 0
COLOR CHANGE: 0
CHEST TIGHTNESS: 0
DIARRHEA: 0
EYES NEGATIVE: 1
RHINORRHEA: 1
ABDOMINAL PAIN: 0
SINUS PRESSURE: 1
CONSTIPATION: 0
NAUSEA: 0
SINUS PAIN: 1
SHORTNESS OF BREATH: 0
ABDOMINAL DISTENTION: 0
SORE THROAT: 0
COUGH: 0

## 2022-11-28 ASSESSMENT — SOCIAL DETERMINANTS OF HEALTH (SDOH): HOW HARD IS IT FOR YOU TO PAY FOR THE VERY BASICS LIKE FOOD, HOUSING, MEDICAL CARE, AND HEATING?: NOT HARD AT ALL

## 2022-11-28 NOTE — PROGRESS NOTES
Riosbev Cardona  1990 11/28/22    Chief Complaint   Patient presents with    Otalgia     Post nasal drip, dark spots under eyes, eye drainage, sinus pressure sx started 1 week ago        SUBJECTIVE:      Mr Omkar Alvares is a current patient of Dr Lilliam Steiner who states that over his last week he has been having right sided otalgia, mild hoarseness of voice, nasal congestion, and sinus pain/pressure. Denies any fevers or chills, nausea, emesis, or diarrhea. Also endorses c/o thick yellow drainage from the bilateral eyes, R>L, which has been progressively worsening over the last few days. Review of Systems   Constitutional:  Negative for activity change, appetite change, fatigue and fever. HENT:  Positive for congestion, postnasal drip, rhinorrhea, sinus pressure and sinus pain. Negative for sore throat. Eyes: Negative. Respiratory:  Negative for cough, chest tightness, shortness of breath and wheezing. Cardiovascular:  Negative for chest pain and palpitations. Gastrointestinal:  Negative for abdominal distention, abdominal pain, constipation, diarrhea and nausea. Genitourinary:  Negative for difficulty urinating, dysuria, flank pain, frequency and hematuria. Musculoskeletal:  Negative for arthralgias, gait problem, joint swelling and myalgias. Skin:  Negative for color change and rash. Neurological:  Negative for dizziness, light-headedness and numbness. Hematological: Negative. Psychiatric/Behavioral: Negative. OBJECTIVE:    /89   Pulse (!) 107   Resp 22   Ht 6' 4\" (1.93 m)   Wt (!) 410 lb (186 kg)   SpO2 98%   BMI 49.91 kg/m²     Physical Exam  Constitutional:       General: He is not in acute distress. Appearance: He is well-developed. He is not diaphoretic. HENT:      Head: Normocephalic and atraumatic. Nose: Nose normal.   Eyes:      Conjunctiva/sclera: Conjunctivae normal.      Pupils: Pupils are equal, round, and reactive to light.    Neck: Thyroid: No thyromegaly. Cardiovascular:      Rate and Rhythm: Normal rate and regular rhythm. Heart sounds: Normal heart sounds. No murmur heard. Pulmonary:      Effort: Pulmonary effort is normal.      Breath sounds: Normal breath sounds. Abdominal:      General: Bowel sounds are normal. There is no distension. Palpations: Abdomen is soft. Tenderness: There is no abdominal tenderness. Musculoskeletal:         General: Normal range of motion. Lymphadenopathy:      Cervical: No cervical adenopathy. Skin:     General: Skin is warm and dry. Capillary Refill: Capillary refill takes less than 2 seconds. Findings: No rash. Neurological:      Mental Status: He is alert and oriented to person, place, and time. GCS: GCS eye subscore is 4. GCS verbal subscore is 5. GCS motor subscore is 6. Cranial Nerves: No cranial nerve deficit. Sensory: No sensory deficit. Coordination: Coordination normal.      Deep Tendon Reflexes: Reflexes normal.   Psychiatric:         Behavior: Behavior normal.         Thought Content: Thought content normal.       ASSESSMENT:    1. Acute non-recurrent maxillary sinusitis    2. Conjunctivitis of right eye, unspecified conjunctivitis type        PLAN:    Jessie Perkins was seen today for otalgia. Diagnoses and all orders for this visit:    Acute non-recurrent maxillary sinusitis- exam most c/w dx. Cover empirically with augmentin, pred taper, and RTO in 1 week if no improvement. -     amoxicillin-clavulanate (AUGMENTIN) 875-125 MG per tablet; Take 1 tablet by mouth 2 times daily for 7 days  -     predniSONE (DELTASONE) 10 MG tablet; Take 4 tablets daily for 2 days, then 3 tablets for 2 days, 2 tablets for 2 days, then 1 tablet for 2 days    Conjunctivitis of right eye, unspecified conjunctivitis type- cover with polytim bilaterally, warm compresses, and good hand hygeine between administrations.    -     trimethoprim-polymyxin b (POLYTRIM) 69215-5.1 UNIT/ML-% ophthalmic solution; Place 1 drop into the right eye every 4 hours for 10 days      No flowsheet data found. No follow-ups on file. Floridalma note this reports has been produced speech recognition software and may contain errors related to that system including errors in grammar, punctuation, and spelling, as well as words and phrases that may be appropriate. If there are any questions or concerns please feel free to contact the dictating provider for clarification.

## 2022-12-06 ENCOUNTER — OFFICE VISIT (OUTPATIENT)
Dept: INTERNAL MEDICINE CLINIC | Age: 32
End: 2022-12-06
Payer: COMMERCIAL

## 2022-12-06 VITALS
RESPIRATION RATE: 16 BRPM | WEIGHT: 315 LBS | OXYGEN SATURATION: 91 % | BODY MASS INDEX: 49.91 KG/M2 | DIASTOLIC BLOOD PRESSURE: 87 MMHG | HEART RATE: 95 BPM | SYSTOLIC BLOOD PRESSURE: 142 MMHG

## 2022-12-06 DIAGNOSIS — I10 ESSENTIAL HYPERTENSION: Primary | ICD-10-CM

## 2022-12-06 DIAGNOSIS — R73.03 PREDIABETES: ICD-10-CM

## 2022-12-06 DIAGNOSIS — I10 ESSENTIAL HYPERTENSION: ICD-10-CM

## 2022-12-06 DIAGNOSIS — E78.5 HYPERLIPIDEMIA, UNSPECIFIED HYPERLIPIDEMIA TYPE: ICD-10-CM

## 2022-12-06 DIAGNOSIS — G47.33 OSA (OBSTRUCTIVE SLEEP APNEA): ICD-10-CM

## 2022-12-06 PROCEDURE — 3078F DIAST BP <80 MM HG: CPT | Performed by: INTERNAL MEDICINE

## 2022-12-06 PROCEDURE — 3074F SYST BP LT 130 MM HG: CPT | Performed by: INTERNAL MEDICINE

## 2022-12-06 PROCEDURE — 99214 OFFICE O/P EST MOD 30 MIN: CPT | Performed by: INTERNAL MEDICINE

## 2022-12-06 RX ORDER — AMLODIPINE BESYLATE 5 MG/1
5 TABLET ORAL DAILY
Qty: 90 TABLET | Refills: 1 | Status: SHIPPED | OUTPATIENT
Start: 2022-12-06

## 2022-12-06 RX ORDER — ATENOLOL AND CHLORTHALIDONE TABLET 100; 25 MG/1; MG/1
1 TABLET ORAL DAILY
Qty: 90 TABLET | Refills: 1 | Status: SHIPPED | OUTPATIENT
Start: 2022-12-06

## 2022-12-06 RX ORDER — ROSUVASTATIN CALCIUM 5 MG/1
5 TABLET, COATED ORAL NIGHTLY
Qty: 90 TABLET | Refills: 1 | Status: SHIPPED | OUTPATIENT
Start: 2022-12-06

## 2022-12-06 NOTE — PROGRESS NOTES
Mya De La Fuente  1990 12/06/22    SUBJECTIVE:    Hypertension: Stable- BORDERLINE HIGH ~130-140/80S. Ghanshyam Nova Denies CP, SOB, cough, visual changes, dizziness, palpitations or HA. STATES PULSE RUNS ~70-80S. Ifg, last glc ~116, we'll check fasting today- is eating a small piece of candy. Lipids:  Is continuing statin therapy and low fat diet. Tolerating medications w/o myalgias or GI upset. He has suspicion of BUBBA, friend notices snoring then pauses. DENIES AM HEADACHE BUT HAS SOME DAYTIME SOMNOLENCE TOO    OBJECTIVE:    BP (!) 142/87   Pulse 95   Resp 16   Wt (!) 410 lb (186 kg)   SpO2 91%   BMI 49.91 kg/m²     Physical Exam  Vitals reviewed. Constitutional:       General: He is not in acute distress. Appearance: He is well-developed. HENT:      Head: Normocephalic and atraumatic. Eyes:      General: No scleral icterus. Right eye: No discharge. Left eye: No discharge. Conjunctiva/sclera: Conjunctivae normal.      Pupils: Pupils are equal, round, and reactive to light. Neck:      Thyroid: No thyromegaly. Vascular: No JVD. Trachea: No tracheal deviation. Cardiovascular:      Rate and Rhythm: Normal rate and regular rhythm. Heart sounds: Normal heart sounds. No murmur heard. No friction rub. No gallop. Pulmonary:      Effort: Pulmonary effort is normal. No respiratory distress. Breath sounds: Normal breath sounds. No wheezing or rales. Abdominal:      General: Bowel sounds are normal. There is no distension. Palpations: Abdomen is soft. There is no mass. Tenderness: There is no abdominal tenderness. There is no guarding or rebound. Musculoskeletal:      Cervical back: Normal range of motion and neck supple. Right lower leg: No edema. Left lower leg: No edema. Lymphadenopathy:      Cervical: No cervical adenopathy. Skin:     General: Skin is warm and dry. Findings: No rash.    Neurological:      Mental Status: He is alert. Psychiatric:         Mood and Affect: Mood normal.       ASSESSMENT:    1. Essential hypertension    2. Hyperlipidemia, unspecified hyperlipidemia type    3. Prediabetes    4. BUBBA (obstructive sleep apnea)        PLAN:    Tiki Anaya was seen today for hypertension. Diagnoses and all orders for this visit:    Essential hypertension - BP HIGH SO WE'LL TRY INCR DOSE ATENOLOL, CHECK LAB    -     atenolol-chlorthalidone (TENORETIC) 100-25 MG per tablet; Take 1 tablet by mouth daily  -     amLODIPine (NORVASC) 5 MG tablet; Take 1 tablet by mouth daily  -     Comprehensive Metabolic Panel; Future  -     Lipid Panel; Future    Hyperlipidemia, unspecified hyperlipidemia type  - Pt will continue to work on a low fat diet and also exercise, wt loss as appropriate. Will continue periodic monitoring of fasting lipid profile, glucose, liver function. -     rosuvastatin (CRESTOR) 5 MG tablet; Take 1 tablet by mouth nightly  -     Comprehensive Metabolic Panel; Future  -     Lipid Panel; Future    Prediabetes- will continue to monitor fasting labs/glc for any progression to DM.   Patient will continue to try to work on diet modification.    -     Hemoglobin A1C; Future    BUBBA (obstructive sleep apnea)- REFER FOR [employer]

## 2022-12-09 ENCOUNTER — HOSPITAL ENCOUNTER (OUTPATIENT)
Dept: PHYSICAL THERAPY | Age: 32
Setting detail: THERAPIES SERIES
Discharge: HOME | End: 2022-12-09
Payer: COMMERCIAL

## 2022-12-09 DIAGNOSIS — R73.03 PREDIABETES: ICD-10-CM

## 2022-12-09 DIAGNOSIS — E78.5 HYPERLIPIDEMIA, UNSPECIFIED HYPERLIPIDEMIA TYPE: Primary | ICD-10-CM

## 2022-12-09 DIAGNOSIS — I10 ESSENTIAL HYPERTENSION: ICD-10-CM

## 2022-12-09 PROCEDURE — 97161 PT EVAL LOW COMPLEX 20 MIN: CPT

## 2022-12-09 PROCEDURE — 97110 THERAPEUTIC EXERCISES: CPT

## 2022-12-09 ASSESSMENT — PAIN - FUNCTIONAL ASSESSMENT: PAIN_FUNCTIONAL_ASSESSMENT: PREVENTS OR INTERFERES SOME ACTIVE ACTIVITIES AND ADLS

## 2022-12-09 ASSESSMENT — PAIN DESCRIPTION - ORIENTATION: ORIENTATION: RIGHT

## 2022-12-09 ASSESSMENT — PAIN SCALES - GENERAL: PAINLEVEL_OUTOF10: 3

## 2022-12-09 ASSESSMENT — PAIN DESCRIPTION - DESCRIPTORS: DESCRIPTORS: SHARP;SORE

## 2022-12-09 ASSESSMENT — PAIN DESCRIPTION - FREQUENCY: FREQUENCY: CONTINUOUS

## 2022-12-09 ASSESSMENT — PAIN DESCRIPTION - LOCATION: LOCATION: BACK;HIP

## 2022-12-09 ASSESSMENT — PAIN DESCRIPTION - ONSET: ONSET: GRADUAL

## 2022-12-09 ASSESSMENT — PAIN DESCRIPTION - PAIN TYPE: TYPE: CHRONIC PAIN

## 2022-12-09 NOTE — FLOWSHEET NOTE
Outpatient Physical Therapy  [facility]           [x] Phone: [phone]   Fax: [phone]  Elyn Gosselin           [] Phone: [phone]   Fax: [phone]        Physical Therapy Daily Treatment Note  Date:  2022    Patient Name:  Gilmer Davidson    :  1990  MRN: [id number]  Restrictions/Precautions: Bariatric table      Diagnosis:   Sciatica, right side [M54.31]    Date of Injury/Surgery:   Treatment Diagnosis:  LBP, flexibility restrictions  Insurance/Certification information: [facility]  Referring Physician:  Apoorva Esparza MD     PCP: Silvana Laguerre MD  Next Doctor Visit:    Plan of care signed (Y/N):  N, sent 22  Outcome Measure: Oswestry: 16% disability   Visit# / total visits:    per POC  Pain level: 3/10   Goals:     Patient goals: improve pain to return to gym. Short term goals  Time Frame for Short term goals: Defer to [address] Term Goals  Time Frame for Long Term Goals: 6 weeks 23  Pt will demo I with HEP/symptom management. Pt will demo <10% disability per Oswestry to demo improved function. Pt will demo full lumbar AROM with increase in pain to ease ADLs. Pt will demo normal flexibility at hip without increase in pain to ease symptoms at low back. Pt will demo lift from floor >30# with good technique without increase in back pain to ease return to gym. Summary of Evaluation:   Pt is 33year old male with 3 month gradual onset of back pain with referred pain to R glute. Pt now has difficulties completing ADLs and waking upon sleeping with increase in pain. Pt demo deficits this date that include LBP, flexibility restrictions and min reduced lumbar AROM. Pt will benefit with PT services with progression of strength/ROM, manual and modalities to return to PLOF. Pt prior to onset of current condition had no pain with able to complete full ADLs and work activities.  Patient received education on their current pathology and how their condition effects them with their functional activities. Patient understood discussion and questions were answered. Patient understands their activity limitations and understands rational for treatment progression. Subjective:  See eval         Any changes in Ambulatory Summary Sheet? None        Objective:  See eval           Exercises: (No more than 4 columns)   Exercise/Equipment 12/9/22  #1 Date Date           WARM UP         Sci fit            TABLE      LTR 10x2  5\"     Supine hip flexor stretch  30\"x3     Piriformis stretching in either sitting/hooklying  20\"x3                    STANDING      Lumbar ext  10x2  3\"                                              PROPRIOCEPTION                                    MODALITIES                      Other Therapeutic Activities/Education:  Patient received education on their current pathology and how their condition effects them with their functional activities. Patient understood discussion and questions were answered. Patient understands their activity limitations and understands rational for treatment progression. Home Exercise Program:  HO issued, reviewed and discussed with patient. Pt agreed to comply. Manual Treatments:        Modalities:        Communication with other providers:  POC sent 12/9/22       Assessment:  (Response towards treatment session) (Pain Rating)   Pt is 33year old male with 3 month gradual onset of back pain with referred pain to R glute. Pt now has difficulties completing ADLs and waking upon sleeping with increase in pain. Pt demo deficits this date that include LBP, flexibility restrictions and min reduced lumbar AROM. Pt will benefit with PT services with progression of strength/ROM, manual and modalities to return to PLOF. Pt prior to onset of current condition had no pain with able to complete full ADLs and work activities.  Patient received education on their current pathology and how their condition effects them with their functional activities. Patient understood discussion and questions were answered. Patient understands their activity limitations and understands rational for treatment progression. Plan for Next Session: review HEP, stretching to surrounding hip and paraspinals, core/hip activation as tolerated, modalities PRN.           Time In / Time Out:    [id number]          Timed Code/Total Treatment Minutes:  13/45'    13' TE, 1 PT Eval       Next Progress Note due:  Eval 12/9/22   Visit 10       Plan of Care Interventions:  [x] Therapeutic Exercise  [x] Modalities:  [x] Therapeutic Activity     [] Ultrasound  [x] Estim  [] Gait Training      [] Cervical Traction [] Lumbar Traction  [x] Neuromuscular Re-education    [x] Cold/hotpack [] Iontophoresis   [x] Instruction in HEP      [] Vasopneumatic   [] Dry Needling    [x] Manual Therapy               [] Aquatic Therapy              Electronically signed by:  Norma Glass PT, DPT, OCS  12/9/2022, 8:48 AM    12/9/2022 8:48 AM

## 2022-12-09 NOTE — PROGRESS NOTES
Physical Therapy: Initial Evaluation    Patient: Natalie Cardona (53 y.o. male)   Examination Date: 8817  Plan of Care Certification Period: 2022 to        :  1990 ;    Confirmed: Yes MRN: [id number]  CSN: [id number]   Insurance: Payor: Kasandra [employer]berna / Plan: [employer] - [place] PPO / Product Type: *No Product type* /   Insurance ID: [id number] - ([address] [place] [address]) Secondary Insurance (if applicable):    Referring Physician: Ulises Hill MD     PCP: Inderjit Freeman MD Visits to Date/Visits Approved:   /      No Show/Cancelled Appts:   /       Medical Diagnosis: Sciatica, right side [M54.31]    Treatment Diagnosis: LBP, flexibility restrictions     PERTINENT MEDICAL HISTORY   Patient Assessed for Rehabilitation Services: Yes  Self reported health status[de-identified] Good    Medical History:     Past Medical History:   Diagnosis Date    Hypertension     Prediabetes 2020    hgb a1c 6.2. Surgical History: No past surgical history on file. Medications:   Current Outpatient Medications:     atenolol-chlorthalidone (TENORETIC) 100-25 MG per tablet, Take 1 tablet by mouth daily, Disp: 90 tablet, Rfl: 1    rosuvastatin (CRESTOR) 5 MG tablet, Take 1 tablet by mouth nightly, Disp: 90 tablet, Rfl: 1    amLODIPine (NORVASC) 5 MG tablet, Take 1 tablet by mouth daily, Disp: 90 tablet, Rfl: 1    atenolol-chlorthalidone (TENORETIC 50) 50-25 MG per tablet, Take 1 tablet by mouth daily, Disp: 90 tablet, Rfl: 1  Allergies: Lipitor [atorvastatin]      SUBJECTIVE EXAMINATION     History obtained from[de-identified] Chart Review, Patient,      Family/Caregiver Present: No    Subjective History:    Subjective: 2 month onset with with low back in R glute region. Denies radaiting pain past glute region. Denies N/T. Denies weakness. Has days with stiffness so bad with difficulty with getting out of bed now once a week and days ~3-4 days a weeek at 1-2/10 pain. Remain functional with standing and walking without increasing pain.  Limits heavy lifting due to fear of aggravation. Denies history of back issues. Taking gabapentin but no longer taking due to dizziness. No follow up in the future. Additional Pertinent Hx (if applicable): Any changes to allergies, medications, or have you had any medical procedures/ER visits since your last visit?: No      Learning/Language: Learning  Does the patient/guardian have any barriers to learning?: No barriers  Will there be a co-learner?: No  What is the preferred language of the patient/guardian?: English  Is an  required?: No  How does the patient/guardian prefer to learn new concepts?: Listening, Reading, Demonstration     Pain Screening   Pain Screening  Patient Currently in Pain: Yes  Pain Assessment: 0-10  Pain Level: 3  Best Pain Level: 1  Worst Pain Level: 9  Patient's Stated Pain Goal: 0 - No pain  Pain Type: Chronic pain  Pain Location: Back, Hip  Pain Orientation: Right  Pain Descriptors: Sharp, Sore  Pain Frequency: Continuous  Pain Onset: Gradual  Functional Pain Assessment: Prevents or interferes some active activities and ADLs  Pain Management/Relieving Factors: Rest    Functional Status    Dominant Hand: : Right    Social History:  Social History  Lives With: Alone  Type of Home: House  Home Layout: Two level    Occupation/Interests:  Occupation: Full time employment  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     Palpation: Denies pain with palpation. Ambulation/Gait (if applicable):   WFL    Balance Screen:   No increase in pain with SLS     Neuro Screen:  WNL  Left AROM  Right AROM       WFL     WFL, no pain with AR OM, only with added applied resistance        Left PROM  Right PROM      General PROM LE: Right WFL, Left WFL (limited hip hip ext due to stiffness.)    General PROM LE: Right WFL, Left WFL (limited hip hip ext due to stiffness.)        Left Strength  Right Strength      General Strength Testing LE: Right WFL, Left WFL (pain into lateral glute region with resisted ER/ABD)    General Strength Testing LE: Right WFL, Left WFL (pain into lateral glute region with resisted ER/ABD)        Lumbar Assessment   AROM Lumbar Spine   Lumbar Spine AROM : Painful  Flexion: distal tibia with min increase in R glute pain. Extension: 50% decrease with no pain  Lateral Flexion Right: R sided pain at end range  Lateral Flexion Left: R sided pain at end range  Right Rotation: WNL  Left Rotation: WNL PROM Lumbar Spine   Lumbar Spine PROM : [facility]     Trunk Strength     Trunk Strength  R Internal Obliques: Normal (No pain with testing.)  R External Obliques: Normal  L Internal Obliques: Normal  L External Obliques: Normal     Muscle Length/Flexibility:  Limited to slight hip ext B for hip flexors     Joint Mobility (if applicable):   Joint Integrity Hip  General Joint Integrity - Hip: Right WNL, Left WNL    Special Tests:    (-) SLR, Slump      Additional Finding(s) (if applicable):    Oswestry: 16% disability        ASSESSMENT     Impression:  Pt is 33year old male with 3 month gradual onset of back pain with referred pain to R glute. Pt now has difficulties completing ADLs and waking upon sleeping with increase in pain. Pt demo deficits this date that include LBP, flexibility restrictions and min reduced lumbar AROM. Pt will benefit with PT services with progression of strength/ROM, manual and modalities to return to PLOF.  Pt prior to onset of current condition had no pain with able to complete full ADLs and work activities. Patient received education on their current pathology and how their condition effects them with their functional activities. Patient understood discussion and questions were answered. Patient understands their activity limitations and understands rational for treatment progression. Body Structures, Functions, Activity Limitations Requiring Skilled Therapeutic Intervention: Decreased functional mobility , Decreased ROM, Decreased strength    Statement of Medical Necessity: Physical Therapy is both indicated and medically necessary as outlined in the POC to increase the likelihood of meeting the functionally related goals stated below. Patient's Activity Tolerance: Patient tolerated evaluation without incident      Patient's rehabilitation potential/prognosis is considered to be: Good    Factors which may impact rehabilitation potential include: None  Measures taken to address barrier(s): N/A     GOALS   Patient Goal(s): improve pain to return to gym. Short Term Goals Completed by Defer to Long Term Goals Goal Status                                                                   Long Term Goals Completed by 6 weeks 1/23/23 Goal Status   Pt will demo I with HEP/symptom management. Pt will demo <10% disability per Oswestry to demo improved function. Pt will demo full lumbar AROM with increase in pain to ease ADLs. Pt will demo normal flexibility at hip without increase in pain to ease symptoms at low back. Pt will demo lift from floor >30# with good technique without increase in back pain to ease return to gym. TREATMENT PLAN       Requires PT Follow-Up: Yes  Specific Instructions for Next Treatment: review HEP, stretching to surrounding hip and paraspinals, core/hip activation as tolerated, modalities PRN.     Pt. actively involved in establishing Plan of Care and Goals: Yes  Patient/ Caregiver education and instruction: Goals, PT Role, Plan of Care, Evaluative findings, Insurance, Home Exercise Program             Treatment may include any combination of the following: Current Treatment Recommendations: Strengthening, ROM, Gait training, Therapeutic activities, Modalities, Neuromuscular re-education, Home exercise program, Balance training, Endurance training     Frequency / Duration:  Patient to be seen 1 for 5 weeks      Eval Complexity: Overall Evaluation : Low  Decision Making: Low Complexity  History: Personal Factors and/or Comorbidities Impacting POC: Low  Examination of body system(s) including body structures and functions, activity limitations, and/or participation restrictions: Low  Clinical Presentation: Low       Therapist Signature: Santana Pace PT, DPT ,OCS    Date: 12/9/2022 05/6/1643 6:18 PM   I certify that the above Therapy Services are being furnished while the patient is under my care. I agree with the treatment plan and certify that this therapy is necessary. Physician's Signature:  ___________________________   Date:_______                                                                   Aubrie Trujillo MD        Physician Comments: _______________________________________________    Please sign and return to [address] [place] [address]. Please fax to the location listed below.  Trevor Edmond for this referral!    [address] [facility] [address], [address] [address]  Dept: [phone]  Dept Fax: [phone]  Loc: [phone]       POC NOTE

## 2022-12-09 NOTE — PLAN OF CARE
[address] [facility]  [facility] [address], [address] [address]  Dept: [phone]  Dept Fax: [phone]  Loc: [phone]    PHYSICAL THERAPY PLAN OF CARE: INITIAL EVALUATION    Patient: Akshat Hubbard (89 y.o. male)   Examination Date: 2272  Plan of Care Certification Period: 2022 to        :  1990  MRN: [id number]  CSN: [id number]   Insurance: Payor: [facility] / Plan: [facility] - [place] PPO / Product Type: *No Product type* /   Insurance ID: [id number] - ([facility]) Secondary Insurance (if applicable):    Referring Physician: Hardeep Anguiano MD     PCP: Caitlyn Lai MD Visits to Date/Visits Approved:   /      No Show/Cancelled Appts:   /       Medical Diagnosis: Sciatica, right side [M54.31]    Treatment Diagnosis: LBP, flexibility restrictions       ASSESSMENT     Impression:  Pt is 33year old male with 3 month gradual onset of back pain with referred pain to R glute. Pt now has difficulties completing ADLs and waking upon sleeping with increase in pain. Pt demo deficits this date that include LBP, flexibility restrictions and min reduced lumbar AROM. Pt will benefit with PT services with progression of strength/ROM, manual and modalities to return to PLOF. Pt prior to onset of current condition had no pain with able to complete full ADLs and work activities. Patient received education on their current pathology and how their condition effects them with their functional activities. Patient understood discussion and questions were answered. Patient understands their activity limitations and understands rational for treatment progression.      Body Structures, Functions, Activity Limitations Requiring Skilled Therapeutic Intervention: Decreased functional mobility , Decreased ROM, Decreased strength    Statement of Medical Necessity: Physical Therapy is both indicated and medically necessary as outlined in the POC to increase the likelihood of meeting the functionally related goals stated below. Patient's Activity Tolerance: Patient tolerated evaluation without incident      Patient's rehabilitation potential/prognosis is considered to be: Good    Factors which may impact rehabilitation potential include: None  Measures taken to address barrier(s): N/A     GOALS   Patient Goal(s): improve pain to return to gym. Short Term Goals Completed by Defer to Long Term Goals Goal Status                                                                   Long Term Goals Completed by 6 weeks 1/23/23 Goal Status   Pt will demo I with HEP/symptom management. Pt will demo <10% disability per Oswestry to demo improved function. Pt will demo full lumbar AROM with increase in pain to ease ADLs. Pt will demo normal flexibility at hip without increase in pain to ease symptoms at low back. Pt will demo lift from floor >30# with good technique without increase in back pain to ease return to gym. TREATMENT PLAN       Requires PT Follow-Up: Yes  Specific Instructions for Next Treatment: review HEP, stretching to surrounding hip and paraspinals, core/hip activation as tolerated, modalities PRN.     Pt. actively involved in establishing Plan of Care and Goals: Yes  Patient/ Caregiver education and instruction: Goals, PT Role, Plan of Care, Evaluative findings, Insurance, Home Exercise Program             Treatment may include any combination of the following: Current Treatment Recommendations: Strengthening, ROM, Gait training, Therapeutic activities, Modalities, Neuromuscular re-education, Home exercise program, Balance training, Endurance training     Frequency / Duration:  Patient to be seen 1 for 5 weeks       Patient Status: [x] Continue / Initiate Plan of Care     Signature: Electronically signed by Pepe Vedruzco, PT, DPT, OCS on 12/9/2022 at 2:46 PM.     12/9/2022 2:47 PM   If you have any questions or concerns, please don't hesitate to call.   Thank you for your referral!

## 2022-12-23 ENCOUNTER — HOSPITAL ENCOUNTER (OUTPATIENT)
Dept: PHYSICAL THERAPY | Age: 32
Discharge: HOME | End: 2022-12-23

## 2022-12-23 NOTE — FLOWSHEET NOTE
Physical Therapy  Cancellation/No-show Note  Patient Name:  Dina Dugan  :  1990   Date:  2022  Cancelled visits to date: 1  No-shows to date: 0    For today's appointment patient:  [x]  Cancelled  []  Rescheduled appointment  []  No-show     Reason given by patient:  []  Patient ill  []  Conflicting appointment  []  No transportation    []  Conflict with work  []  No reason given  []  Other:     Comments:  going to be very late     Electronically signed by:  Lino Weinstein, PT, DPT, OCS     2022 3:49 PM

## 2023-01-06 ENCOUNTER — HOSPITAL ENCOUNTER (OUTPATIENT)
Dept: PHYSICAL THERAPY | Age: 33
Setting detail: THERAPIES SERIES
Discharge: HOME | End: 2023-01-06
Payer: COMMERCIAL

## 2023-01-06 PROCEDURE — 97110 THERAPEUTIC EXERCISES: CPT

## 2023-01-06 PROCEDURE — 97140 MANUAL THERAPY 1/> REGIONS: CPT

## 2023-01-06 NOTE — FLOWSHEET NOTE
Outpatient Physical Therapy  [facility]           [x] Phone: [phone]   Fax: [phone]  [place]           [] Phone: [phone]   Fax: [phone]        Physical Therapy Daily Treatment Note  Date:  2023    Patient Name:  Nena Flores    :  1990  MRN: [id number]  Restrictions/Precautions: Bariatric table      Diagnosis:   Sciatica, right side [M54.31]    Date of Injury/Surgery:   Treatment Diagnosis:  LBP, flexibility restrictions  Insurance/Certification information: [facility]  Referring Physician:  Lauren Simpson MD     PCP: Iwona Acevedo MD  Next Doctor Visit:    Plan of care signed (Y/N):  Yes  Outcome Measure: Oswestry: 16% disability   Visit# / total visits:   2/5 per POC  Pain level: 2/10   Goals:     Patient goals:    Short term goals  Time Frame for Short term goals:            Long Term Goals  Time Frame for Long Term Goals:           Patient goals: improve pain to return to gym. Short term goals  Time Frame for Short term goals: Defer to [address] Term Goals  Time Frame for Long Term Goals: 6 weeks 23  Pt will demo I with HEP/symptom management. Pt will demo <10% disability per Oswestry to demo improved function. Pt will demo full lumbar AROM with increase in pain to ease ADLs. Pt will demo normal flexibility at hip without increase in pain to ease symptoms at low back. Pt will demo lift from floor >30# with good technique without increase in back pain to ease return to gym. Summary of Evaluation:   Pt is 33year old male with 3 month gradual onset of back pain with referred pain to R glute. Pt now has difficulties completing ADLs and waking upon sleeping with increase in pain. Pt demo deficits this date that include LBP, flexibility restrictions and min reduced lumbar AROM. Pt will benefit with PT services with progression of strength/ROM, manual and modalities to return to PLOF.  Pt prior to onset of current condition had no pain with able to complete full ADLs and work activities. Patient received education on their current pathology and how their condition effects them with their functional activities. Patient understood discussion and questions were answered. Patient understands their activity limitations and understands rational for treatment progression. Subjective:   2/10 pain into LB and R glute region. Sunday was rough at 7/10 pain, unclear cause. Completing HEP. Any changes in Ambulatory Summary Sheet? None        Objective:       Good technique with exercises   Full lumbar ext in prone with press ups   No reported increase in pain with all activities including manual   No increase in pain with transfers. Exercises: (No more than 4 columns)   Exercise/Equipment 12/9/22  #1 1/6/23   #2 Date           WARM UP         Sci fit  Lv1   4'           TABLE      LTR 10x2  5\" 10x2  5\"     Supine hip flexor stretch  30\"x3 30\"x3    Piriformis stretching in either sitting/hooklying  20\"x3 man    Heelslides   GSB 10x2    Bridge   GSB 10x2    Press ups   5x2  5\"                                     STANDING      Lumbar ext  10x2  3\" 10x   3\"     Paloff press  FM 13# 10x2                                       PROPRIOCEPTION                                    MODALITIES                      Other Therapeutic Activities/Education:  Patient received education on their current pathology and how their condition effects them with their functional activities. Patient understood discussion and questions were answered. Patient understands their activity limitations and understands rational for treatment progression. Home Exercise Program:  HO issued, reviewed and discussed with patient. Pt agreed to comply.         Manual Treatments:  manual STM to R paraspinals and R glute region with hypervolt, R LE longitudinal distraction  x15'       Modalities:        Communication with other providers:  POC sent 12/9/22       Assessment: (Response towards treatment session) (Pain Rating)    Good technique with home program. Tolerated additional activities well. Low pain with low irritability. Improvement in pain with manual. Will return in 2 weeks to assess changes. Will progress home program as appropriate. Pt would continue to benefit from skilled therapy interventions to address remaining impairments, improve mobility and strength and progress toward goal completion while reducing risk for re-injury or further decline. Reported 1/10 pain post tx. Pt is 33year old male with 3 month gradual onset of back pain with referred pain to R glute. Pt now has difficulties completing ADLs and waking upon sleeping with increase in pain. Pt demo deficits this date that include LBP, flexibility restrictions and min reduced lumbar AROM. Pt will benefit with PT services with progression of strength/ROM, manual and modalities to return to PLOF. Pt prior to onset of current condition had no pain with able to complete full ADLs and work activities. Patient received education on their current pathology and how their condition effects them with their functional activities. Patient understood discussion and questions were answered. Patient understands their activity limitations and understands rational for treatment progression. Plan for Next Session:  stretching to surrounding hip and paraspinals, core/hip activation as tolerated, modalities PRN.           Time In / Time Out:    2168-3976          Timed Code/Total Treatment Minutes:  50/50'      35' TE, 15' man       Next Progress Note due:  [facility] 12/9/22   Visit 10       Plan of Care Interventions:  [x] Therapeutic Exercise  [x] Modalities:  [x] Therapeutic Activity     [] Ultrasound  [x] Estim  [] Gait Training      [] Cervical Traction [] Lumbar Traction  [x] Neuromuscular Re-education    [x] Cold/hotpack [] Iontophoresis   [x] Instruction in HEP      [] Vasopneumatic   [] Dry Needling    [x] Manual Therapy               [] Aquatic Therapy              Electronically signed by:  Charolet Hamman, PT, DPT, OCS  12/9/2022, 8:48 AM    12/9/2022 8:48 AM

## 2023-01-27 ENCOUNTER — HOSPITAL ENCOUNTER (OUTPATIENT)
Dept: PHYSICAL THERAPY | Age: 33
Setting detail: THERAPIES SERIES
Discharge: HOME | End: 2023-01-27
Payer: COMMERCIAL

## 2023-01-27 PROCEDURE — 97110 THERAPEUTIC EXERCISES: CPT

## 2023-01-27 PROCEDURE — 97140 MANUAL THERAPY 1/> REGIONS: CPT

## 2023-01-27 NOTE — FLOWSHEET NOTE
Outpatient Physical Therapy  [place]           [x] Phone: [phone]   Fax: [phone]  [facility]           [] Phone: [phone]   Fax: [phone]        Physical Therapy Daily Treatment Note  Date:  2023    Patient Name:  Adalgisa Alejandro    :  1990  MRN: [id number]  Restrictions/Precautions: Bariatric table      Diagnosis:   Sciatica, right side [M54.31]    Date of Injury/Surgery:   Treatment Diagnosis:  LBP, flexibility restrictions  Insurance/Certification information: [facility]  Referring Physician:  Garth Dillon MD     PCP: Jose Hughes MD  Next Doctor Visit:    Plan of care signed (Y/N):  Yes  Outcome Measure: Oswestry: 16% disability   Visit# / total visits:   3/5 per POC  Pain level: 1 /10   Goals:     Patient goals:    Short term goals  Time Frame for Short term goals:            Long Term Goals  Time Frame for Long Term Goals:           Patient goals: improve pain to return to gym. Short term goals  Time Frame for Short term goals: Defer to [address] Term Goals  Time Frame for Long Term Goals: 6 weeks 23  Pt will demo I with HEP/symptom management. Pt will demo <10% disability per Oswestry to demo improved function. MET   Pt will demo full lumbar AROM with increase in pain to ease ADLs. MET  Pt will demo normal flexibility at hip without increase in pain to ease symptoms at low back. MET  Pt will demo lift from floor >30# with good technique without increase in back pain to ease return to gym. MET             Summary of Evaluation:   Pt is 33year old male with 3 month gradual onset of back pain with referred pain to R glute. Pt now has difficulties completing ADLs and waking upon sleeping with increase in pain. Pt demo deficits this date that include LBP, flexibility restrictions and min reduced lumbar AROM. Pt will benefit with PT services with progression of strength/ROM, manual and modalities to return to PLOF.  Pt prior to onset of current condition had no pain with able to complete full ADLs and work activities. Patient received education on their current pathology and how their condition effects them with their functional activities. Patient understood discussion and questions were answered. Patient understands their activity limitations and understands rational for treatment progression. Subjective:   1/10 pain into LB and R glute region. No bad pain days for a while. States 1-2 days a week mod pain days. Completing HEP. Denies glute region pain. Any changes in Ambulatory Summary Sheet? None        Objective:       Good technique with exercises   Full lumbar ROM without increase in pain  5/5 LE strength   Good (+) core strength   No increase in pain with transfers. Oswestry: 2% disability     Exercises: (No more than 4 columns)   Exercise/Equipment 12/9/22  #1 1/6/23   #2 1/27/23   #3           WARM UP         Sci fit  Lv1   4'  Lv1  4'          TABLE      LTR 10x2  5\" 10x2  5\"     Supine hip flexor stretch  30\"x3 30\"x3 Discuss    Piriformis stretching in either sitting/hooklying  20\"x3 man    Heelslides   GSB 10x2    Bridge   GSB 10x2 10x   Press ups   5x2  5\" 5x  2\"                                    STANDING      Lumbar ext  10x2  3\" 10x   3\"  10x  3\"   Paloff press  FM 13# 10x2                                       PROPRIOCEPTION                                    MODALITIES                      Other Therapeutic Activities/Education:  --      Home Exercise Program:  HO issued, reviewed and discussed with patient. Pt agreed to comply. Manual Treatments:  manual STM to R paraspinals and R glute region with hypervolt, R LE longitudinal distraction  x15'       Modalities:        Communication with other providers:  POC sent 12/9/22       Assessment:  (Response towards treatment session) (Pain Rating)    Jessica Mullins has completed 3 visits since start of care on 12/9/22. Low pain with low irritability.  No longer with radiating pain. Will place on hold and return within 30 days if indicated. Will discharge if no return is indicated. Pt agreed to this plan. Reported 1/10 pain post tx. Pt is 33year old male with 3 month gradual onset of back pain with referred pain to R glute. Pt now has difficulties completing ADLs and waking upon sleeping with increase in pain. Pt demo deficits this date that include LBP, flexibility restrictions and min reduced lumbar AROM. Pt will benefit with PT services with progression of strength/ROM, manual and modalities to return to PLOF. Pt prior to onset of current condition had no pain with able to complete full ADLs and work activities. Patient received education on their current pathology and how their condition effects them with their functional activities. Patient understood discussion and questions were answered. Patient understands their activity limitations and understands rational for treatment progression. Plan for Next Session:  stretching to surrounding hip and paraspinals, core/hip activation as tolerated, modalities PRN.           Time In / Time Out:    5129-3326          Timed Code/Total Treatment Minutes:  40/40'    25' TE, 13' man       Next Progress Note due:  [facility] 12/9/22   Visit 10       Plan of Care Interventions:  [x] Therapeutic Exercise  [x] Modalities:  [x] Therapeutic Activity     [] Ultrasound  [x] Estim  [] Gait Training      [] Cervical Traction [] Lumbar Traction  [x] Neuromuscular Re-education    [x] Cold/hotpack [] Iontophoresis   [x] Instruction in HEP      [] Vasopneumatic   [] Dry Needling    [x] Manual Therapy               [] Aquatic Therapy              Electronically signed by:  Lulú Burnett, PT, DPT, OCS  12/9/2022, 8:48 AM    12/9/2022 8:48 AM

## 2023-02-03 ENCOUNTER — HOSPITAL ENCOUNTER (OUTPATIENT)
Dept: SLEEP CENTER | Age: 33
Discharge: HOME | End: 2023-02-03
Payer: COMMERCIAL

## 2023-02-03 VITALS — HEART RATE: 103 BPM | HEIGHT: 76 IN | WEIGHT: 315 LBS | OXYGEN SATURATION: 96 % | BODY MASS INDEX: 38.36 KG/M2

## 2023-02-03 DIAGNOSIS — G47.10 HYPERSOMNOLENCE: Primary | ICD-10-CM

## 2023-02-03 DIAGNOSIS — R06.83 SNORING: ICD-10-CM

## 2023-02-03 PROCEDURE — 99211 OFF/OP EST MAY X REQ PHY/QHP: CPT

## 2023-02-03 ASSESSMENT — SLEEP AND FATIGUE QUESTIONNAIRES
HOW LIKELY ARE YOU TO NOD OFF OR FALL ASLEEP WHILE SITTING QUIETLY AFTER LUNCH WITHOUT ALCOHOL: 2
HOW LIKELY ARE YOU TO NOD OFF OR FALL ASLEEP WHILE LYING DOWN TO REST IN THE AFTERNOON WHEN CIRCUMSTANCES PERMIT: 3
HOW LIKELY ARE YOU TO NOD OFF OR FALL ASLEEP WHILE WATCHING TV: 2
HOW LIKELY ARE YOU TO NOD OFF OR FALL ASLEEP WHILE SITTING AND TALKING TO SOMEONE: 0
ESS TOTAL SCORE: 10
HOW LIKELY ARE YOU TO NOD OFF OR FALL ASLEEP WHILE SITTING INACTIVE IN A PUBLIC PLACE: 0
HOW LIKELY ARE YOU TO NOD OFF OR FALL ASLEEP WHILE SITTING AND READING: 0
HOW LIKELY ARE YOU TO NOD OFF OR FALL ASLEEP WHEN YOU ARE A PASSENGER IN A CAR FOR AN HOUR WITHOUT A BREAK: 3
NECK CIRCUMFERENCE (INCHES): 21.25
HOW LIKELY ARE YOU TO NOD OFF OR FALL ASLEEP IN A CAR, WHILE STOPPED FOR A FEW MINUTES IN TRAFFIC: 0

## 2023-02-03 NOTE — PROGRESS NOTES
[facility]      Edmundo Reid MD, FACP, [facility]  Jordin Chavarria MD, [facility]  Paul Cedeno MD, [facility]      [address]  [address]  [place] [address]   PH: [phone]  F: [phone]     Subjective:     Patient ID: Brennen Bedolla is a 32 y.o. male, referred to the sleep center for consultation with a sleep specialist.     Reason for Consultation/Chief Complaint:   Chief Complaint   Patient presents with    Consultation    Sleep Apnea     G47.33       Referring physician:  Dr.M.Narcelles HORNE    Symptoms:   [x]  Snoring                                                                [x]  Dry Mouth  []  Choking                                                               []  Morning Headaches  []  Gasping for Air                                                    []  Trouble Falling asleep  [x]  Tired during the daytime                                     [x]  Trouble Staying Asleep  [x]  Tired when you wake up                                     [x]  Weight Gain in Last 5 Years  []  Wake up frequently at night                                []  Weight Loss in Last 5 Years  []  Shortness Of Breath                                            [x]  Shift Worker  []  Coughing                                                             []  Smoker (Previous or Current)  []  Chest Pain                                                           []  Anxiety  []  Trouble keeping your legs still at night                []  Depression  []  Kicking your legs in your sleep                            []  Insomnia            []  Other:     Significant Co-morbidities:  []  Congestive Heart Failure     []  COPD         []  Stroke (Past 30 Days)      []  Supplemental Oxygen Usage       []  Cognitive Impairment      []  Neuromuscular Problems  []  Epilepsy/Neurological Disorders         Duration of Sleep Problems:    History:    Social History     Socioeconomic  History    Marital status: Single     Spouse name: Not on file    Number of children: Not on file    Years of education: Not on file    Highest education level: Not on file   Occupational History    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   Tobacco Use    Smoking status: Never    Smokeless tobacco: Never   Substance and Sexual Activity    Alcohol use: Yes     Comment: Occasional    Drug use: No    Sexual activity: Not on file   Other Topics Concern    Not on file   Social History Narrative    Not on file     Social Determinants of Health     Financial Resource Strain: Low Risk     Difficulty of Paying Living Expenses: Not hard at all   Food Insecurity: No Food Insecurity    Worried About Running Out of Food in the Last Year: Never true    Ran Out of Food in the Last Year: Never true   Transportation Needs: Not on file   Physical Activity: Not on file   Stress: Not on file   Social Connections: Not on file   Intimate Partner Violence: Not on file   Housing Stability: Not on file       Prior to Admission medications    Medication Sig Start Date End Date Taking?  Authorizing Provider   atenolol-chlorthalidone (TENORETIC) 100-25 MG per tablet Take 1 tablet by mouth daily 12/6/22  Yes Lizbeth Ochoa MD   rosuvastatin (CRESTOR) 5 MG tablet Take 1 tablet by mouth nightly 12/6/22  Yes Lizbeth Ochoa MD   amLODIPine (NORVASC) 5 MG tablet Take 1 tablet by mouth daily 12/6/22  Yes Lizbeth Ochoa MD   atenolol-chlorthalidone (TENORETIC 50) 50-25 MG per tablet Take 1 tablet by mouth daily 6/6/22  Yes Lizbeth Ochoa MD       Allergies as of 02/03/2023 - Fully Reviewed 02/03/2023   Allergen Reaction Noted    Lipitor [atorvastatin]  09/26/2022       Patient Active Problem List   Diagnosis    Hypertension    Seborrheic dermatitis    Hyperpigmentation    Prediabetes    Snoring    Hypersomnolence       Past Medical History:   Diagnosis Date    Hypertension     Prediabetes 08/06/2020    hgb a1c 6.2. No past surgical history on file. Family History   Problem Relation Age of Onset    Diabetes Mother     High Cholesterol Mother     Hypertension Mother     Diabetes Father     High Cholesterol Father     Hypertension Father          Objective:     Vitals:    02/03/23 1537   Pulse: (!) 103   SpO2: 96%   Weight: (!) 407 lb (184.6 kg)   Height: 6' 4\" (1.93 m)     Body mass index is 49.54 kg/m². Neck - Neck circumference (Inches): 21.25  Inches  [place] Sleepiness Score: 10    REVIEW OF SYSTEMS:  Gen: No distress. Eyes: PERRL. No sclera icterus. No conjunctival injection. ENT: No discharge. Pharynx clear. External appearance of ears and nose normal.  Neck: Trachea midline. No obvious mass. Resp: No accessory muscle use. No crackles. No wheezes. No rhonchi. No dullness on percussion. CV: Regular rate. Regular rhythm. No murmur or rub. No edema. GI: Non-tender. Non-distended. No hernia. Skin: Warm, dry, normal texture and turgor. No nodule on exposed extremities. Lymph: No cervical LAD. No supraclavicular LAD. M/S: No cyanosis. No clubbing. No joint deformity. Psych: Oriented x 3. No anxiety. Awake. Alert. Intact judgement and insight. Mallampati Airway Classification:   []1 []2 []3 [x]4          Previous CPAP Usage:    [x]  Patient has never worn PAP. []  Patient has worn PAP previously but discontinued use. []  Current PAP user. Assessment:      Diagnosis:  EDS,Snoring R/O BUBBA. Plan:     1.HST.  2.Advised wt reduction. 3.RTO 1 mth.

## 2023-02-27 ENCOUNTER — HOSPITAL ENCOUNTER (OUTPATIENT)
Dept: SLEEP CENTER | Age: 33
Discharge: HOME | End: 2023-02-27
Payer: COMMERCIAL

## 2023-02-27 DIAGNOSIS — R06.83 SNORING: ICD-10-CM

## 2023-02-27 DIAGNOSIS — G47.10 HYPERSOMNOLENCE: ICD-10-CM

## 2023-02-27 PROCEDURE — G0398 HOME SLEEP TEST/TYPE 2 PORTA: HCPCS

## 2023-02-27 NOTE — PROGRESS NOTES
Clay Smith  1990  arrived at Sleep Center on 2/27/2023 for set up and instruction of home sleep study with the [facility] unit. he was instructed on proper set-up and operation of HST unit. Written instructions with set up diagram given for reference and reinforcement of verbal instruction and demonstration. he was able to return demonstration appropriately. No home environment, vision, dexterity, comprehension concerns with this patient based on completed forms and patient interactions. Patient will return unit after 2 nights as instructed.     Electronically signed by Sabina Johnson on 2/27/2023 at 2:41 PM

## 2023-03-01 ENCOUNTER — TELEPHONE (OUTPATIENT)
Dept: SLEEP CENTER | Age: 33
End: 2023-03-01

## 2023-03-24 ENCOUNTER — HOSPITAL ENCOUNTER (OUTPATIENT)
Dept: SLEEP CENTER | Age: 33
Discharge: HOME | End: 2023-03-24

## 2023-03-24 DIAGNOSIS — G47.33 OSA (OBSTRUCTIVE SLEEP APNEA): Primary | ICD-10-CM

## 2023-03-24 PROCEDURE — 9990000010 HC NO CHARGE VISIT

## 2023-03-24 NOTE — PROGRESS NOTES
Yes Harley Alcaraz MD   atenolol-chlorthalidone (TENORETIC 50) 50-25 MG per tablet Take 1 tablet by mouth daily 6/6/22  Yes Harley Alcaraz MD       Allergies as of 03/24/2023 - Fully Reviewed 03/24/2023   Allergen Reaction Noted    Lipitor [atorvastatin]  09/26/2022       Patient Active Problem List   Diagnosis    Hypertension    Seborrheic dermatitis    Hyperpigmentation    Prediabetes    Snoring    Hypersomnolence    BUBBA (obstructive sleep apnea)       Past Medical History:   Diagnosis Date    Hypertension     Prediabetes 08/06/2020    hgb a1c 6.2. History reviewed. No pertinent surgical history. Family History   Problem Relation Age of Onset    Diabetes Mother     High Cholesterol Mother     Hypertension Mother     Diabetes Father     High Cholesterol Father     Hypertension Father          Objective: There were no vitals filed for this visit. Neck -    Inches  [place] -        Assessment:      Diagnosis:  Severe BUBBA. Plan: 1. Arrange Apap 5/20. 2.Advised wt reduction. 3.RTO 3 mths.         Orders Placed This Encounter   Procedures    DME Order for CPAP as OP          Electronically signed by Antonio Parra MD on 3/24/2023 at 3:28 PM

## 2023-04-05 ENCOUNTER — OFFICE VISIT (OUTPATIENT)
Dept: INTERNAL MEDICINE CLINIC | Age: 33
End: 2023-04-05
Payer: COMMERCIAL

## 2023-04-05 VITALS
HEIGHT: 76 IN | WEIGHT: 315 LBS | SYSTOLIC BLOOD PRESSURE: 118 MMHG | RESPIRATION RATE: 20 BRPM | HEART RATE: 74 BPM | OXYGEN SATURATION: 98 % | DIASTOLIC BLOOD PRESSURE: 72 MMHG | BODY MASS INDEX: 38.36 KG/M2

## 2023-04-05 DIAGNOSIS — J01.00 ACUTE NON-RECURRENT MAXILLARY SINUSITIS: Primary | ICD-10-CM

## 2023-04-05 DIAGNOSIS — I10 ESSENTIAL HYPERTENSION: ICD-10-CM

## 2023-04-05 PROCEDURE — 99213 OFFICE O/P EST LOW 20 MIN: CPT | Performed by: NURSE PRACTITIONER

## 2023-04-05 PROCEDURE — 3074F SYST BP LT 130 MM HG: CPT | Performed by: NURSE PRACTITIONER

## 2023-04-05 PROCEDURE — 3078F DIAST BP <80 MM HG: CPT | Performed by: NURSE PRACTITIONER

## 2023-04-05 RX ORDER — AMOXICILLIN AND CLAVULANATE POTASSIUM 875; 125 MG/1; MG/1
1 TABLET, FILM COATED ORAL 2 TIMES DAILY
Qty: 14 TABLET | Refills: 0 | Status: SHIPPED | OUTPATIENT
Start: 2023-04-05 | End: 2023-04-12

## 2023-04-05 RX ORDER — AMLODIPINE BESYLATE 5 MG/1
TABLET ORAL
Qty: 90 TABLET | Refills: 1 | OUTPATIENT
Start: 2023-04-05

## 2023-04-05 RX ORDER — PREDNISONE 10 MG/1
TABLET ORAL
Qty: 20 TABLET | Refills: 0 | Status: SHIPPED | OUTPATIENT
Start: 2023-04-05

## 2023-04-05 SDOH — ECONOMIC STABILITY: INCOME INSECURITY: HOW HARD IS IT FOR YOU TO PAY FOR THE VERY BASICS LIKE FOOD, HOUSING, MEDICAL CARE, AND HEATING?: NOT HARD AT ALL

## 2023-04-05 SDOH — ECONOMIC STABILITY: FOOD INSECURITY: WITHIN THE PAST 12 MONTHS, YOU WORRIED THAT YOUR FOOD WOULD RUN OUT BEFORE YOU GOT MONEY TO BUY MORE.: NEVER TRUE

## 2023-04-05 SDOH — ECONOMIC STABILITY: FOOD INSECURITY: WITHIN THE PAST 12 MONTHS, THE FOOD YOU BOUGHT JUST DIDN'T LAST AND YOU DIDN'T HAVE MONEY TO GET MORE.: NEVER TRUE

## 2023-04-05 SDOH — ECONOMIC STABILITY: HOUSING INSECURITY
IN THE LAST 12 MONTHS, WAS THERE A TIME WHEN YOU DID NOT HAVE A STEADY PLACE TO SLEEP OR SLEPT IN A SHELTER (INCLUDING NOW)?: NO

## 2023-04-05 ASSESSMENT — ENCOUNTER SYMPTOMS
DIARRHEA: 0
SORE THROAT: 0
WHEEZING: 0
COLOR CHANGE: 0
SHORTNESS OF BREATH: 0
SINUS PRESSURE: 1
NAUSEA: 0
SINUS PAIN: 1
COUGH: 0
CHEST TIGHTNESS: 0
EYES NEGATIVE: 1
CONSTIPATION: 0
ABDOMINAL PAIN: 0
ABDOMINAL DISTENTION: 0

## 2023-04-05 ASSESSMENT — PATIENT HEALTH QUESTIONNAIRE - PHQ9
SUM OF ALL RESPONSES TO PHQ QUESTIONS 1-9: 0
SUM OF ALL RESPONSES TO PHQ9 QUESTIONS 1 & 2: 0
1. LITTLE INTEREST OR PLEASURE IN DOING THINGS: 0
2. FEELING DOWN, DEPRESSED OR HOPELESS: 0
DEPRESSION UNABLE TO ASSESS: FUNCTIONAL CAPACITY MOTIVATION LIMITS ACCURACY

## 2023-04-05 NOTE — PROGRESS NOTES
respiratory distress. Breath sounds: No wheezing. Abdominal:      General: Abdomen is flat. Bowel sounds are normal. There is no distension. Skin:     General: Skin is warm and dry. Findings: No erythema or rash. Neurological:      Mental Status: He is alert and oriented to person, place, and time. ASSESSMENT:    1. Acute non-recurrent maxillary sinusitis        PLAN:    Evy Erickson was seen today for sinus problem. Diagnoses and all orders for this visit:    Acute non-recurrent maxillary sinusitis- presentation most c/w dx; cover empirically with Augmentin/pred taper. Continue sinus rinses; RTO in 1 week if no improvement. -     amoxicillin-clavulanate (AUGMENTIN) 875-125 MG per tablet; Take 1 tablet by mouth 2 times daily for 7 days  -     predniSONE (DELTASONE) 10 MG tablet; Take 4 tablets daily for 2 days, then 3 tablets for 2 days, 2 tablets for 2 days, then 1 tablet for 2 days        No flowsheet data found. No follow-ups on file. [place] note this reports has been produced speech recognition software and may contain errors related to that system including errors in grammar, punctuation, and spelling, as well as words and phrases that may be appropriate. If there are any questions or concerns please feel free to contact the dictating provider for clarification.

## 2023-04-17 DIAGNOSIS — I10 ESSENTIAL HYPERTENSION: ICD-10-CM

## 2023-04-17 RX ORDER — AMLODIPINE BESYLATE 5 MG/1
5 TABLET ORAL DAILY
Qty: 90 TABLET | Refills: 1 | Status: SHIPPED | OUTPATIENT
Start: 2023-04-17

## 2023-04-17 NOTE — TELEPHONE ENCOUNTER
Patient does not have script that was printed in [facility] [address] and never turned it in.

## 2023-04-26 ENCOUNTER — HOSPITAL ENCOUNTER (EMERGENCY)
Age: 33
Discharge: HOME | End: 2023-04-26
Payer: COMMERCIAL

## 2023-04-26 VITALS
OXYGEN SATURATION: 96 % | SYSTOLIC BLOOD PRESSURE: 176 MMHG | DIASTOLIC BLOOD PRESSURE: 114 MMHG | TEMPERATURE: 98.8 F | HEART RATE: 86 BPM | RESPIRATION RATE: 16 BRPM

## 2023-04-26 DIAGNOSIS — G44.319 ACUTE POST-TRAUMATIC HEADACHE, NOT INTRACTABLE: Primary | ICD-10-CM

## 2023-04-26 PROCEDURE — 6370000000 HC RX 637 (ALT 250 FOR IP): Performed by: PHYSICIAN ASSISTANT

## 2023-04-26 PROCEDURE — 99283 EMERGENCY DEPT VISIT LOW MDM: CPT

## 2023-04-26 RX ORDER — ACETAMINOPHEN 325 MG/1
650 TABLET ORAL ONCE
Status: COMPLETED | OUTPATIENT
Start: 2023-04-26 | End: 2023-04-26

## 2023-04-26 RX ADMIN — ACETAMINOPHEN 650 MG: 325 TABLET ORAL at 23:19

## 2023-04-26 ASSESSMENT — PAIN SCALES - GENERAL: PAINLEVEL_OUTOF10: 8

## 2023-04-27 NOTE — ED PROVIDER NOTES
See Note of attending physician for EKG interpretation. Chart review shows recent radiograph(s):  No results found. MDM:     Problems Addressed:  1. Acute post-traumatic headache, not intractable        Interventions given this visit:   Orders Placed This Encounter   Medications    acetaminophen (TYLENOL) tablet 650 mg      Interventions listed are used to treat Problem list above    Ct considered however given pt has zero sx aside from mild headache and no neuro deficits will forego    -DDX   Tbi, concusion, headache    Neurologically intact on initial repeat examinations patient presents today with small abrasion on the head. After hitting his head on a bookshelf no syncope no vomiting neuro intact. Mild headache but no neurological symptoms. Will not do CT scan. Patient will be provided with Tylenol for his headache ice for his head. I have discussed the findings of today's workup with the patient and present family members and have addressed their questions and concerns. Important warning signs as well as new or worsening symptoms which would necessitate immediate return to the ED were discussed. The plan is to discharge from the ED at this time, and the patient is in stable condition. The patient acknowledged understanding is agreeable with this plan. The patient and/or family and I have discussed the diagnosis and risks, and we agree with discharging home to follow-up with their primary care, specialist or referral doctor. Questions addressed. Disposition and follow-up agreed upon. Specific discharge instructions explained. We have discussed the symptoms which are most concerning that necessitate immediate return. We also discussed returning to the Emergency Department immediately if new or worsening symptoms occur. I independently managed patient today in the ED.       BP (!) 176/114   Pulse 86   Temp 98.8 °F (37.1 °C) (Oral)   Resp 16   SpO2 96%       Clinical Impression:  1.  Acute

## 2023-06-06 ENCOUNTER — OFFICE VISIT (OUTPATIENT)
Dept: INTERNAL MEDICINE CLINIC | Age: 33
End: 2023-06-06
Payer: COMMERCIAL

## 2023-06-06 VITALS
WEIGHT: 315 LBS | OXYGEN SATURATION: 94 % | HEART RATE: 75 BPM | BODY MASS INDEX: 50.15 KG/M2 | DIASTOLIC BLOOD PRESSURE: 84 MMHG | SYSTOLIC BLOOD PRESSURE: 132 MMHG | RESPIRATION RATE: 17 BRPM

## 2023-06-06 DIAGNOSIS — I10 ESSENTIAL HYPERTENSION: ICD-10-CM

## 2023-06-06 DIAGNOSIS — L72.3 SEBACEOUS CYST: ICD-10-CM

## 2023-06-06 DIAGNOSIS — Z00.00 ROUTINE GENERAL MEDICAL EXAMINATION AT A HEALTH CARE FACILITY: Primary | ICD-10-CM

## 2023-06-06 DIAGNOSIS — H61.23 BILATERAL IMPACTED CERUMEN: ICD-10-CM

## 2023-06-06 PROCEDURE — 99395 PREV VISIT EST AGE 18-39: CPT | Performed by: INTERNAL MEDICINE

## 2023-06-06 PROCEDURE — 3075F SYST BP GE 130 - 139MM HG: CPT | Performed by: INTERNAL MEDICINE

## 2023-06-06 PROCEDURE — 69210 REMOVE IMPACTED EAR WAX UNI: CPT | Performed by: INTERNAL MEDICINE

## 2023-06-06 PROCEDURE — 3079F DIAST BP 80-89 MM HG: CPT | Performed by: INTERNAL MEDICINE

## 2023-06-06 RX ORDER — ATENOLOL AND CHLORTHALIDONE TABLET 100; 25 MG/1; MG/1
1 TABLET ORAL DAILY
Qty: 90 TABLET | Refills: 1 | Status: CANCELLED | OUTPATIENT
Start: 2023-06-06

## 2023-06-06 RX ORDER — ATENOLOL AND CHLORTHALIDONE TABLET 100; 25 MG/1; MG/1
1 TABLET ORAL DAILY
Qty: 90 TABLET | Refills: 1 | Status: SHIPPED | OUTPATIENT
Start: 2023-06-06

## 2023-06-06 RX ORDER — SULFAMETHOXAZOLE AND TRIMETHOPRIM 800; 160 MG/1; MG/1
1 TABLET ORAL 2 TIMES DAILY
Qty: 14 TABLET | Refills: 0 | Status: SHIPPED | OUTPATIENT
Start: 2023-06-06 | End: 2023-06-13

## 2023-06-06 NOTE — PROGRESS NOTES
Dina Knife  1990 06/06/23  For gen physical today      SUBJECTIVE:  Hypertension: Stable. Denies CP, SOB, cough, visual changes, dizziness, palpitations or HA. Blood pressure typically runs 130s/80s outside of the office. Wt fluctuates but is watching diet and also exercising more regularly. The past two weeks w a tender nodule noted L chin, no drainage. Cerumen impaction noted bilat w some muffling on L. Lipids:  Has history of high cholesterol, not on medication but trying to continue regular low fat diet and maintenance of weight, regular exercise. Allergies   Allergen Reactions    Lipitor [Atorvastatin]      Gi upset and sweats     Current Outpatient Medications   Medication Sig Dispense Refill    sulfamethoxazole-trimethoprim (BACTRIM DS;SEPTRA DS) 800-160 MG per tablet Take 1 tablet by mouth 2 times daily for 7 days 14 tablet 0    atenolol-chlorthalidone (TENORETIC) 100-25 MG per tablet Take 1 tablet by mouth daily 90 tablet 1    amLODIPine (NORVASC) 5 MG tablet Take 1 tablet by mouth daily 90 tablet 1     No current facility-administered medications for this visit. Immunization History   Administered Date(s) Administered    COVID-19, PFIZER GRAY top, DO NOT Dilute, (age 15 y+), IM, 30 mcg/0.3 mL 08/19/2022    COVID-19, PFIZER PURPLE top, DILUTE for use, (age 15 y+), 30mcg/0.3mL 03/24/2021, 04/14/2021    Influenza Virus Vaccine 11/16/2017    Influenza, FLUCELVAX, (age 10 mo+), MDCK, PF, 0.5mL 09/26/2022    TDaP, ADACEL (age 10y-63y), Claressa Kajalier (age 10y+), IM, 0.5mL 07/15/2022       Patient Active Problem List   Diagnosis    Hypertension    Seborrheic dermatitis    Hyperpigmentation    Prediabetes    Snoring    Hypersomnolence    BUBBA (obstructive sleep apnea)    Body mass index (BMI) 50.0-59.9, adult [facility])       Past Medical History:   Diagnosis Date    Hypertension     Prediabetes 08/06/2020    hgb a1c 6.2. History reviewed. No pertinent surgical history.   Family History

## 2023-06-19 ENCOUNTER — HOSPITAL ENCOUNTER (OUTPATIENT)
Age: 33
Discharge: HOME | End: 2023-06-19
Payer: COMMERCIAL

## 2023-06-19 DIAGNOSIS — Z00.00 ROUTINE GENERAL MEDICAL EXAMINATION AT A HEALTH CARE FACILITY: ICD-10-CM

## 2023-06-19 DIAGNOSIS — I10 ESSENTIAL HYPERTENSION: ICD-10-CM

## 2023-06-19 LAB
ALBUMIN SERPL-MCNC: 4.3 GM/DL (ref 3.4–5)
ALP BLD-CCNC: 118 IU/L (ref 40–128)
ALT SERPL-CCNC: 19 U/L (ref 10–40)
ANION GAP SERPL CALCULATED.3IONS-SCNC: 13 MMOL/L (ref 4–16)
AST SERPL-CCNC: 13 IU/L (ref 15–37)
BACTERIA: NEGATIVE /HPF
BILIRUB SERPL-MCNC: 0.5 MG/DL (ref 0–1)
BILIRUBIN URINE: NEGATIVE MG/DL
BLOOD, URINE: ABNORMAL
BUN SERPL-MCNC: 13 MG/DL (ref 6–23)
CALCIUM SERPL-MCNC: 9.1 MG/DL (ref 8.3–10.6)
CHLORIDE BLD-SCNC: 99 MMOL/L (ref 99–110)
CHOLEST SERPL-MCNC: 189 MG/DL
CLARITY: CLEAR
CO2: 26 MMOL/L (ref 21–32)
COLOR: YELLOW
CREAT SERPL-MCNC: 0.9 MG/DL (ref 0.9–1.3)
GFR SERPL CREATININE-BSD FRML MDRD: >60 ML/MIN/1.73M2
GLUCOSE SERPL-MCNC: 262 MG/DL (ref 70–99)
GLUCOSE, URINE: >1000 MG/DL
HCT VFR BLD CALC: 46 % (ref 42–52)
HDLC SERPL-MCNC: 30 MG/DL
HEMOGLOBIN: 14.6 GM/DL (ref 13.5–18)
KETONES, URINE: NEGATIVE MG/DL
LDLC SERPL CALC-MCNC: 122 MG/DL
LEUKOCYTE ESTERASE, URINE: NEGATIVE
MCH RBC QN AUTO: 27.2 PG (ref 27–31)
MCHC RBC AUTO-ENTMCNC: 31.7 % (ref 32–36)
MCV RBC AUTO: 85.8 FL (ref 78–100)
MUCUS: ABNORMAL HPF
NITRITE URINE, QUANTITATIVE: NEGATIVE
PDW BLD-RTO: 13.6 % (ref 11.7–14.9)
PH, URINE: 6.5 (ref 5–8)
PLATELET # BLD: 362 K/CU MM (ref 140–440)
PMV BLD AUTO: 9.8 FL (ref 7.5–11.1)
POTASSIUM SERPL-SCNC: 4 MMOL/L (ref 3.5–5.1)
PROTEIN UA: ABNORMAL MG/DL
RBC # BLD: 5.36 M/CU MM (ref 4.6–6.2)
RBC URINE: 1 /HPF (ref 0–3)
SODIUM BLD-SCNC: 138 MMOL/L (ref 135–145)
SPECIFIC GRAVITY UA: 1.02 (ref 1–1.03)
TOTAL PROTEIN: 6.6 GM/DL (ref 6.4–8.2)
TRICHOMONAS: ABNORMAL /HPF
TRIGL SERPL-MCNC: 187 MG/DL
TSH SERPL DL<=0.005 MIU/L-ACNC: 1.32 UIU/ML (ref 0.27–4.2)
UROBILINOGEN, URINE: 1 MG/DL (ref 0.2–1)
WBC # BLD: 7.5 K/CU MM (ref 4–10.5)
WBC UA: 1 /HPF (ref 0–2)

## 2023-06-19 PROCEDURE — 83036 HEMOGLOBIN GLYCOSYLATED A1C: CPT

## 2023-06-19 PROCEDURE — 85027 COMPLETE CBC AUTOMATED: CPT

## 2023-06-19 PROCEDURE — 80053 COMPREHEN METABOLIC PANEL: CPT

## 2023-06-19 PROCEDURE — 84443 ASSAY THYROID STIM HORMONE: CPT

## 2023-06-19 PROCEDURE — 36415 COLL VENOUS BLD VENIPUNCTURE: CPT

## 2023-06-19 PROCEDURE — 81001 URINALYSIS AUTO W/SCOPE: CPT

## 2023-06-19 PROCEDURE — 80061 LIPID PANEL: CPT

## 2023-06-20 DIAGNOSIS — E11.9 TYPE 2 DIABETES MELLITUS WITHOUT COMPLICATION, WITHOUT LONG-TERM CURRENT USE OF INSULIN (HCC): Primary | ICD-10-CM

## 2023-06-20 DIAGNOSIS — R73.9 HYPERGLYCEMIA: Primary | ICD-10-CM

## 2023-06-20 DIAGNOSIS — I10 ESSENTIAL HYPERTENSION: ICD-10-CM

## 2023-06-20 LAB
ESTIMATED AVERAGE GLUCOSE: 240 MG/DL
HBA1C MFR BLD: 10 % (ref 4.2–6.3)

## 2023-06-20 RX ORDER — GLYBURIDE-METFORMIN HYDROCHLORIDE 5; 500 MG/1; MG/1
1 TABLET ORAL
Qty: 180 TABLET | Refills: 0 | Status: SHIPPED | OUTPATIENT
Start: 2023-06-20

## 2023-06-20 RX ORDER — ASPIRIN 81 MG/1
81 TABLET, CHEWABLE ORAL DAILY
Qty: 30 TABLET | Refills: 3 | COMMUNITY
Start: 2023-06-20

## 2023-06-20 RX ORDER — BLOOD-GLUCOSE METER
1 KIT MISCELLANEOUS DAILY
Qty: 1 KIT | Refills: 0 | Status: SHIPPED | OUTPATIENT
Start: 2023-06-20

## 2023-06-20 RX ORDER — ROSUVASTATIN CALCIUM 5 MG/1
5 TABLET, COATED ORAL NIGHTLY
Qty: 90 TABLET | Refills: 0 | Status: SHIPPED | OUTPATIENT
Start: 2023-06-20

## 2023-06-20 RX ORDER — GLUCOSAMINE HCL/CHONDROITIN SU 500-400 MG
CAPSULE ORAL
Qty: 100 STRIP | Refills: 1 | Status: SHIPPED | OUTPATIENT
Start: 2023-06-20

## 2023-06-20 RX ORDER — LANCETS 30 GAUGE
1 EACH MISCELLANEOUS DAILY
Qty: 100 EACH | Refills: 5 | Status: SHIPPED | OUTPATIENT
Start: 2023-06-20

## 2023-06-20 RX ORDER — LISINOPRIL 5 MG/1
5 TABLET ORAL DAILY
Qty: 90 TABLET | Refills: 0 | Status: SHIPPED | OUTPATIENT
Start: 2023-06-20

## 2023-06-20 NOTE — RESULT ENCOUNTER NOTE
Call pt, labs ok/chol ok- EXCEPT APPEARS HE'S NOW DIABETIC. ADD HGB A1C, PENDING RESULTS WE'LL LIKELY START ON ORAL DM MEDS THEN F/U APPT IN ONE MONTH. FOR NOW REC TO START A VERY STRICT LOW CARB DIET/DM DIET AND WORK AGGRESSIVELY ON DAILY EXERCISE, CONT WT LOSS.

## 2023-06-20 NOTE — RESULT ENCOUNTER NOTE
Call pt, labs CONFIRM DIABETES W HIS AVG SUGAR THE HGB A1C 10.0.  TWO YRS AGO WAS 6.2 C/W PREDIABETES. WE NEED ELLIOTT TO WORK AGGRESSIVELY W DAILY EXERCISE, STRICT LOW CARB/LOW SUGAR DIET AND WE'LL START 4 OTHER MEDS- 1, A KIDNEY AND BP MED LISINOPRIL 5MG DAILY, 2, BABY ASPIRIN 81MG DAILY WHICH IS OTC, 3, CHOL MED, CRESTOR 5MG DAILY, AND 4, GLUCOVANCE 5/500MG TWICE/DAY BEFORE MEALS. I'LL SEND IN SCRIPT FOR GLUCOMETER ALSO, HE ONLY NEEDS TO CHECK FASTING SUGAR IN AM MAYBE 2-3X/WEEK, AIMING TO EVENTUALLY GET HIS SUGAR TO <140 IN AM.    NEED F/U APPT W ME TOO IN ONE MONTH.

## 2023-07-21 ENCOUNTER — OFFICE VISIT (OUTPATIENT)
Dept: INTERNAL MEDICINE CLINIC | Age: 33
End: 2023-07-21
Payer: COMMERCIAL

## 2023-07-21 VITALS
WEIGHT: 315 LBS | DIASTOLIC BLOOD PRESSURE: 84 MMHG | BODY MASS INDEX: 50.17 KG/M2 | OXYGEN SATURATION: 95 % | HEART RATE: 75 BPM | SYSTOLIC BLOOD PRESSURE: 136 MMHG

## 2023-07-21 DIAGNOSIS — B35.1 ONYCHOMYCOSIS: ICD-10-CM

## 2023-07-21 DIAGNOSIS — E11.9 TYPE 2 DIABETES MELLITUS WITHOUT COMPLICATION, WITHOUT LONG-TERM CURRENT USE OF INSULIN (HCC): Primary | ICD-10-CM

## 2023-07-21 DIAGNOSIS — E78.5 HYPERLIPIDEMIA, UNSPECIFIED HYPERLIPIDEMIA TYPE: ICD-10-CM

## 2023-07-21 DIAGNOSIS — I10 PRIMARY HYPERTENSION: ICD-10-CM

## 2023-07-21 PROCEDURE — 3075F SYST BP GE 130 - 139MM HG: CPT | Performed by: INTERNAL MEDICINE

## 2023-07-21 PROCEDURE — 99214 OFFICE O/P EST MOD 30 MIN: CPT | Performed by: INTERNAL MEDICINE

## 2023-07-21 PROCEDURE — 3079F DIAST BP 80-89 MM HG: CPT | Performed by: INTERNAL MEDICINE

## 2023-07-21 PROCEDURE — 3046F HEMOGLOBIN A1C LEVEL >9.0%: CPT | Performed by: INTERNAL MEDICINE

## 2023-07-21 RX ORDER — TERBINAFINE HYDROCHLORIDE 250 MG/1
250 TABLET ORAL DAILY
Qty: 90 TABLET | Refills: 0 | Status: SHIPPED | OUTPATIENT
Start: 2023-07-21 | End: 2023-10-19

## 2023-07-21 RX ORDER — GLYBURIDE-METFORMIN HYDROCHLORIDE 5; 500 MG/1; MG/1
1 TABLET ORAL
Qty: 180 TABLET | Refills: 0 | Status: SHIPPED | OUTPATIENT
Start: 2023-07-21

## 2023-08-14 ENCOUNTER — OFFICE VISIT (OUTPATIENT)
Dept: INTERNAL MEDICINE CLINIC | Age: 33
End: 2023-08-14
Payer: COMMERCIAL

## 2023-08-14 VITALS
SYSTOLIC BLOOD PRESSURE: 136 MMHG | RESPIRATION RATE: 16 BRPM | DIASTOLIC BLOOD PRESSURE: 80 MMHG | HEART RATE: 78 BPM | OXYGEN SATURATION: 97 %

## 2023-08-14 DIAGNOSIS — M54.6 ACUTE LEFT-SIDED THORACIC BACK PAIN: Primary | ICD-10-CM

## 2023-08-14 DIAGNOSIS — J30.89 SEASONAL ALLERGIC RHINITIS DUE TO OTHER ALLERGIC TRIGGER: ICD-10-CM

## 2023-08-14 DIAGNOSIS — R07.81 RIB PAIN ON LEFT SIDE: ICD-10-CM

## 2023-08-14 LAB — DIABETIC RETINOPATHY: NORMAL

## 2023-08-14 PROCEDURE — 3075F SYST BP GE 130 - 139MM HG: CPT | Performed by: INTERNAL MEDICINE

## 2023-08-14 PROCEDURE — 3079F DIAST BP 80-89 MM HG: CPT | Performed by: INTERNAL MEDICINE

## 2023-08-14 PROCEDURE — 99213 OFFICE O/P EST LOW 20 MIN: CPT | Performed by: INTERNAL MEDICINE

## 2023-08-14 RX ORDER — ETODOLAC 400 MG/1
400 TABLET, FILM COATED ORAL DAILY
Qty: 30 TABLET | Refills: 0 | Status: SHIPPED | OUTPATIENT
Start: 2023-08-14 | End: 2023-09-13

## 2023-08-14 RX ORDER — PREDNISONE 5 MG/1
TABLET ORAL
Qty: 21 TABLET | Refills: 0 | Status: SHIPPED | OUTPATIENT
Start: 2023-08-14

## 2023-08-14 NOTE — PROGRESS NOTES
Kong Maya  1990 08/14/23    SUBJECTIVE:  TWO WEEK HX OF L RIB PAIN, ONE WEEK HX OF LBP. SITTING OR LYING CERTAIN POSITIONS EXAC SX. NO FALL, TRAUMA,     ALLERGIES WORSE W SUMMER, ZYRTEC NOT HELPING. Dm- SUGAR BETTER, RECENTLY 115 IN AM  Lab Results   Component Value Date    LABA1C 10.0 (H) 06/19/2023    LABA1C 6.2 08/05/2020    LABA1C 5.8 05/01/2019     Lab Results   Component Value Date    LDLCALC 122 (H) 06/19/2023    CREATININE 0.9 06/19/2023         OBJECTIVE:    /80   Pulse 78   Resp 16   SpO2 97%     Physical Exam  Constitutional:       Appearance: Normal appearance. Cardiovascular:      Rate and Rhythm: Normal rate and regular rhythm. Heart sounds: No murmur heard. No gallop. Pulmonary:      Effort: No respiratory distress. Breath sounds: No wheezing. Abdominal:      General: Abdomen is flat. Bowel sounds are normal. There is no distension. Palpations: Abdomen is soft. There is no mass. Tenderness: There is no abdominal tenderness. Hernia: No hernia is present. Musculoskeletal:         General: Tenderness present. Back:       Right lower leg: No edema. Left lower leg: No edema. Comments: Muscle spasms and tenderness L thoracic region, tender L lateral rib area too. No rash/evid of shinges. Neurological:      Mental Status: He is alert. Psychiatric:         Mood and Affect: Mood normal.       ASSESSMENT:    1. Acute left-sided thoracic back pain    2. Seasonal allergic rhinitis due to other allergic trigger    3. Rib pain on left side        PLAN:    Antonietta Robertson was seen today for rib pain, allergies and back pain. Diagnoses and all orders for this visit:    Acute left-sided thoracic back pain- SUSPECT SPRAIN, TRIAL PRED TAPER THEN LODINE. -     etodolac (LODINE) 400 MG tablet; Take 1 tablet by mouth daily  -     predniSONE (DELTASONE) 5 MG tablet;  Take 6 tablets by mouth on day 1, 5 on day 2, 4 on day 3, 3 on day 4, 2 on day

## 2023-09-07 DIAGNOSIS — I10 ESSENTIAL HYPERTENSION: ICD-10-CM

## 2023-09-08 RX ORDER — AMLODIPINE BESYLATE 5 MG/1
5 TABLET ORAL DAILY
Qty: 90 TABLET | Refills: 1 | Status: SHIPPED | OUTPATIENT
Start: 2023-09-08

## 2023-09-19 DIAGNOSIS — B35.1 ONYCHOMYCOSIS: ICD-10-CM

## 2023-09-19 RX ORDER — TERBINAFINE HYDROCHLORIDE 250 MG/1
250 TABLET ORAL DAILY
Qty: 90 TABLET | Refills: 0 | Status: SHIPPED | OUTPATIENT
Start: 2023-09-19 | End: 2023-12-18

## 2023-09-21 ENCOUNTER — OFFICE VISIT (OUTPATIENT)
Dept: INTERNAL MEDICINE CLINIC | Age: 33
End: 2023-09-21
Payer: COMMERCIAL

## 2023-09-21 VITALS
BODY MASS INDEX: 50.27 KG/M2 | RESPIRATION RATE: 18 BRPM | DIASTOLIC BLOOD PRESSURE: 76 MMHG | WEIGHT: 315 LBS | OXYGEN SATURATION: 96 % | SYSTOLIC BLOOD PRESSURE: 134 MMHG | HEART RATE: 69 BPM

## 2023-09-21 DIAGNOSIS — E11.65 TYPE 2 DIABETES MELLITUS WITH HYPERGLYCEMIA, WITHOUT LONG-TERM CURRENT USE OF INSULIN (HCC): ICD-10-CM

## 2023-09-21 DIAGNOSIS — Z23 NEED FOR IMMUNIZATION AGAINST INFLUENZA: ICD-10-CM

## 2023-09-21 DIAGNOSIS — I10 ESSENTIAL HYPERTENSION: ICD-10-CM

## 2023-09-21 DIAGNOSIS — E11.9 TYPE 2 DIABETES MELLITUS WITHOUT COMPLICATION, WITHOUT LONG-TERM CURRENT USE OF INSULIN (HCC): ICD-10-CM

## 2023-09-21 DIAGNOSIS — M54.9 ACUTE BACK PAIN, UNSPECIFIED BACK LOCATION, UNSPECIFIED BACK PAIN LATERALITY: ICD-10-CM

## 2023-09-21 DIAGNOSIS — E11.9 TYPE 2 DIABETES MELLITUS WITHOUT COMPLICATION, WITHOUT LONG-TERM CURRENT USE OF INSULIN (HCC): Primary | ICD-10-CM

## 2023-09-21 LAB
ALBUMIN SERPL-MCNC: 4.5 G/DL (ref 3.4–5)
ALBUMIN/GLOB SERPL: 1.8 {RATIO} (ref 1.1–2.2)
ALP SERPL-CCNC: 85 U/L (ref 40–129)
ALT SERPL-CCNC: 22 U/L (ref 10–40)
ANION GAP SERPL CALCULATED.3IONS-SCNC: 11 MMOL/L (ref 3–16)
AST SERPL-CCNC: 17 U/L (ref 15–37)
BILIRUB SERPL-MCNC: 0.5 MG/DL (ref 0–1)
BUN SERPL-MCNC: 15 MG/DL (ref 7–20)
CALCIUM SERPL-MCNC: 8.9 MG/DL (ref 8.3–10.6)
CHLORIDE SERPL-SCNC: 101 MMOL/L (ref 99–110)
CHOLEST SERPL-MCNC: 165 MG/DL (ref 0–199)
CO2 SERPL-SCNC: 27 MMOL/L (ref 21–32)
CREAT SERPL-MCNC: 1 MG/DL (ref 0.9–1.3)
CREAT UR-MCNC: 250.3 MG/DL (ref 39–259)
GFR SERPLBLD CREATININE-BSD FMLA CKD-EPI: >60 ML/MIN/{1.73_M2}
GLUCOSE SERPL-MCNC: 122 MG/DL (ref 70–99)
HDLC SERPL-MCNC: 34 MG/DL (ref 40–60)
LDLC SERPL CALC-MCNC: 105 MG/DL
MICROALBUMIN UR DL<=1MG/L-MCNC: 3.5 MG/DL
MICROALBUMIN/CREAT UR: 14 MG/G (ref 0–30)
POTASSIUM SERPL-SCNC: 3.5 MMOL/L (ref 3.5–5.1)
PROT SERPL-MCNC: 7 G/DL (ref 6.4–8.2)
SODIUM SERPL-SCNC: 139 MMOL/L (ref 136–145)
TRIGL SERPL-MCNC: 129 MG/DL (ref 0–150)
VLDLC SERPL CALC-MCNC: 26 MG/DL

## 2023-09-21 PROCEDURE — 99214 OFFICE O/P EST MOD 30 MIN: CPT | Performed by: INTERNAL MEDICINE

## 2023-09-21 PROCEDURE — 3046F HEMOGLOBIN A1C LEVEL >9.0%: CPT | Performed by: INTERNAL MEDICINE

## 2023-09-21 PROCEDURE — 90674 CCIIV4 VAC NO PRSV 0.5 ML IM: CPT | Performed by: INTERNAL MEDICINE

## 2023-09-21 PROCEDURE — 90471 IMMUNIZATION ADMIN: CPT | Performed by: INTERNAL MEDICINE

## 2023-09-21 PROCEDURE — 3075F SYST BP GE 130 - 139MM HG: CPT | Performed by: INTERNAL MEDICINE

## 2023-09-21 PROCEDURE — 36415 COLL VENOUS BLD VENIPUNCTURE: CPT | Performed by: INTERNAL MEDICINE

## 2023-09-21 PROCEDURE — 3078F DIAST BP <80 MM HG: CPT | Performed by: INTERNAL MEDICINE

## 2023-09-21 RX ORDER — LISINOPRIL 5 MG/1
5 TABLET ORAL DAILY
Qty: 90 TABLET | Refills: 0 | Status: SHIPPED | OUTPATIENT
Start: 2023-09-21

## 2023-09-21 RX ORDER — ROSUVASTATIN CALCIUM 5 MG/1
5 TABLET, COATED ORAL NIGHTLY
Qty: 90 TABLET | Refills: 0 | Status: SHIPPED | OUTPATIENT
Start: 2023-09-21

## 2023-09-21 RX ORDER — GLYBURIDE-METFORMIN HYDROCHLORIDE 5; 500 MG/1; MG/1
1 TABLET ORAL
Qty: 180 TABLET | Refills: 0 | Status: SHIPPED | OUTPATIENT
Start: 2023-09-21

## 2023-09-21 RX ORDER — TIZANIDINE 2 MG/1
2 TABLET ORAL 2 TIMES DAILY PRN
Qty: 60 TABLET | Refills: 1 | Status: SHIPPED | OUTPATIENT
Start: 2023-09-21

## 2023-09-21 NOTE — PATIENT INSTRUCTIONS
Please watch on [place]    Please watch the [employer] series Live to 100, Se[employer] of the [employer]    Try muscle relaxant but if no better in the next 3-4 weeks call us back to set up for physical therapy.

## 2023-09-22 LAB
EST. AVERAGE GLUCOSE BLD GHB EST-MCNC: 203 MG/DL
HBA1C MFR BLD: 8.7 %

## 2023-09-22 NOTE — RESULT ENCOUNTER NOTE
Call pt, labs ok/chol ok w chol dropping from 189-165, but sugars are still sl high, a1c dropped from 10 to 8.7 but is more ideal in 7s range. Rec cont aggressive work w DM diet, working on daily exercise and at least a 10# wt loss. If a1c is not down to 7s range by next appt we'll consider additional medication if needed.

## 2023-12-01 DIAGNOSIS — I10 ESSENTIAL HYPERTENSION: ICD-10-CM

## 2023-12-01 RX ORDER — ATENOLOL AND CHLORTHALIDONE TABLET 50; 25 MG/1; MG/1
1 TABLET ORAL DAILY
Qty: 30 TABLET | OUTPATIENT
Start: 2023-12-01

## 2023-12-06 ENCOUNTER — OFFICE VISIT (OUTPATIENT)
Dept: INTERNAL MEDICINE CLINIC | Age: 33
End: 2023-12-06
Payer: COMMERCIAL

## 2023-12-06 VITALS
BODY MASS INDEX: 49.42 KG/M2 | RESPIRATION RATE: 17 BRPM | HEART RATE: 74 BPM | DIASTOLIC BLOOD PRESSURE: 78 MMHG | WEIGHT: 315 LBS | SYSTOLIC BLOOD PRESSURE: 132 MMHG | OXYGEN SATURATION: 97 %

## 2023-12-06 DIAGNOSIS — E78.5 HYPERLIPIDEMIA, UNSPECIFIED HYPERLIPIDEMIA TYPE: ICD-10-CM

## 2023-12-06 DIAGNOSIS — E11.65 TYPE 2 DIABETES MELLITUS WITH HYPERGLYCEMIA, WITHOUT LONG-TERM CURRENT USE OF INSULIN (HCC): ICD-10-CM

## 2023-12-06 DIAGNOSIS — B35.1 ONYCHOMYCOSIS: ICD-10-CM

## 2023-12-06 DIAGNOSIS — M54.6 ACUTE LEFT-SIDED THORACIC BACK PAIN: ICD-10-CM

## 2023-12-06 DIAGNOSIS — I10 ESSENTIAL HYPERTENSION: ICD-10-CM

## 2023-12-06 DIAGNOSIS — E11.65 TYPE 2 DIABETES MELLITUS WITH HYPERGLYCEMIA, WITHOUT LONG-TERM CURRENT USE OF INSULIN (HCC): Primary | ICD-10-CM

## 2023-12-06 PROCEDURE — 3078F DIAST BP <80 MM HG: CPT | Performed by: INTERNAL MEDICINE

## 2023-12-06 PROCEDURE — 36415 COLL VENOUS BLD VENIPUNCTURE: CPT | Performed by: INTERNAL MEDICINE

## 2023-12-06 PROCEDURE — 3052F HG A1C>EQUAL 8.0%<EQUAL 9.0%: CPT | Performed by: INTERNAL MEDICINE

## 2023-12-06 PROCEDURE — 99214 OFFICE O/P EST MOD 30 MIN: CPT | Performed by: INTERNAL MEDICINE

## 2023-12-06 PROCEDURE — 3075F SYST BP GE 130 - 139MM HG: CPT | Performed by: INTERNAL MEDICINE

## 2023-12-06 RX ORDER — TERBINAFINE HYDROCHLORIDE 250 MG/1
250 TABLET ORAL DAILY
Qty: 30 TABLET | Refills: 0 | Status: SHIPPED | OUTPATIENT
Start: 2023-12-06 | End: 2024-03-05

## 2023-12-06 RX ORDER — ATENOLOL AND CHLORTHALIDONE TABLET 100; 25 MG/1; MG/1
1 TABLET ORAL DAILY
Qty: 90 TABLET | Refills: 1 | Status: SHIPPED | OUTPATIENT
Start: 2023-12-06

## 2023-12-06 RX ORDER — ETODOLAC 400 MG/1
400 TABLET, FILM COATED ORAL DAILY
Qty: 30 TABLET | Refills: 0 | Status: CANCELLED | OUTPATIENT
Start: 2023-12-06 | End: 2024-01-05

## 2023-12-07 DIAGNOSIS — E11.65 TYPE 2 DIABETES MELLITUS WITH HYPERGLYCEMIA, WITHOUT LONG-TERM CURRENT USE OF INSULIN (HCC): Primary | ICD-10-CM

## 2023-12-07 DIAGNOSIS — E11.9 TYPE 2 DIABETES MELLITUS WITHOUT COMPLICATION, WITHOUT LONG-TERM CURRENT USE OF INSULIN (HCC): ICD-10-CM

## 2023-12-07 LAB
ALBUMIN SERPL-MCNC: 4.5 G/DL (ref 3.4–5)
ALBUMIN/GLOB SERPL: 1.8 {RATIO} (ref 1.1–2.2)
ALP SERPL-CCNC: 86 U/L (ref 40–129)
ALT SERPL-CCNC: 18 U/L (ref 10–40)
ANION GAP SERPL CALCULATED.3IONS-SCNC: 10 MMOL/L (ref 3–16)
AST SERPL-CCNC: 17 U/L (ref 15–37)
BILIRUB SERPL-MCNC: 0.3 MG/DL (ref 0–1)
BUN SERPL-MCNC: 13 MG/DL (ref 7–20)
CALCIUM SERPL-MCNC: 9.6 MG/DL (ref 8.3–10.6)
CHLORIDE SERPL-SCNC: 102 MMOL/L (ref 99–110)
CHOLEST SERPL-MCNC: 184 MG/DL (ref 0–199)
CO2 SERPL-SCNC: 30 MMOL/L (ref 21–32)
CREAT SERPL-MCNC: 1.1 MG/DL (ref 0.9–1.3)
EST. AVERAGE GLUCOSE BLD GHB EST-MCNC: 185.8 MG/DL
GFR SERPLBLD CREATININE-BSD FMLA CKD-EPI: >60 ML/MIN/{1.73_M2}
GLUCOSE SERPL-MCNC: 161 MG/DL (ref 70–99)
HBA1C MFR BLD: 8.1 %
HDLC SERPL-MCNC: 32 MG/DL (ref 40–60)
LDLC SERPL CALC-MCNC: 124 MG/DL
POTASSIUM SERPL-SCNC: 3.9 MMOL/L (ref 3.5–5.1)
PROT SERPL-MCNC: 7 G/DL (ref 6.4–8.2)
SODIUM SERPL-SCNC: 142 MMOL/L (ref 136–145)
TRIGL SERPL-MCNC: 140 MG/DL (ref 0–150)
VLDLC SERPL CALC-MCNC: 28 MG/DL

## 2023-12-07 RX ORDER — REPAGLINIDE 1 MG/1
1 TABLET ORAL
Qty: 180 TABLET | Refills: 1 | Status: SHIPPED | OUTPATIENT
Start: 2023-12-07 | End: 2024-06-04

## 2023-12-07 NOTE — RESULT ENCOUNTER NOTE
Chol, sugars, labs appear in stable range, DM CONTROL IMPROVED W A1C AVG SUGAR DROPPING FROM 10-8.7 NOW TO 8.1.   TO OPTIMIZE SUGARS FURTHER, REC WE ADD ONE MORE DM MED CALLED PRANDIN, TAKING TWICE/DAY BEFORE BREAKFAST AND SUPPER.  notify pt please.

## 2024-01-02 DIAGNOSIS — I10 ESSENTIAL HYPERTENSION: ICD-10-CM

## 2024-01-02 DIAGNOSIS — E11.9 TYPE 2 DIABETES MELLITUS WITHOUT COMPLICATION, WITHOUT LONG-TERM CURRENT USE OF INSULIN (HCC): ICD-10-CM

## 2024-01-02 RX ORDER — AMLODIPINE BESYLATE 5 MG/1
5 TABLET ORAL DAILY
Qty: 90 TABLET | Refills: 1 | OUTPATIENT
Start: 2024-01-02

## 2024-01-02 RX ORDER — GLYBURIDE-METFORMIN HYDROCHLORIDE 5; 500 MG/1; MG/1
1 TABLET ORAL
Qty: 180 TABLET | Refills: 0 | Status: SHIPPED | OUTPATIENT
Start: 2024-01-02

## 2024-02-19 DIAGNOSIS — E11.9 TYPE 2 DIABETES MELLITUS WITHOUT COMPLICATION, WITHOUT LONG-TERM CURRENT USE OF INSULIN (HCC): ICD-10-CM

## 2024-02-19 RX ORDER — GLYBURIDE-METFORMIN HYDROCHLORIDE 5; 500 MG/1; MG/1
1 TABLET ORAL
Qty: 180 TABLET | Refills: 0 | Status: SHIPPED | OUTPATIENT
Start: 2024-02-19

## 2024-02-26 DIAGNOSIS — M54.6 ACUTE LEFT-SIDED THORACIC BACK PAIN: Primary | ICD-10-CM

## 2024-02-26 DIAGNOSIS — M54.9 ACUTE BACK PAIN, UNSPECIFIED BACK LOCATION, UNSPECIFIED BACK PAIN LATERALITY: ICD-10-CM

## 2024-02-26 RX ORDER — TIZANIDINE 2 MG/1
2 TABLET ORAL 2 TIMES DAILY PRN
Qty: 60 TABLET | Refills: 1 | Status: SHIPPED | OUTPATIENT
Start: 2024-02-26

## 2024-02-26 RX ORDER — NAPROXEN 500 MG/1
500 TABLET ORAL 2 TIMES DAILY WITH MEALS
Qty: 20 TABLET | Refills: 0 | Status: SHIPPED | OUTPATIENT
Start: 2024-02-26 | End: 2024-03-07

## 2024-03-11 ENCOUNTER — OFFICE VISIT (OUTPATIENT)
Dept: INTERNAL MEDICINE CLINIC | Age: 34
End: 2024-03-11
Payer: COMMERCIAL

## 2024-03-11 VITALS
HEART RATE: 74 BPM | RESPIRATION RATE: 16 BRPM | DIASTOLIC BLOOD PRESSURE: 100 MMHG | BODY MASS INDEX: 38.36 KG/M2 | WEIGHT: 315 LBS | OXYGEN SATURATION: 98 % | SYSTOLIC BLOOD PRESSURE: 152 MMHG | HEIGHT: 76 IN

## 2024-03-11 DIAGNOSIS — M54.42 ACUTE LEFT-SIDED LOW BACK PAIN WITH LEFT-SIDED SCIATICA: Primary | ICD-10-CM

## 2024-03-11 PROCEDURE — 3080F DIAST BP >= 90 MM HG: CPT | Performed by: INTERNAL MEDICINE

## 2024-03-11 PROCEDURE — 3077F SYST BP >= 140 MM HG: CPT | Performed by: INTERNAL MEDICINE

## 2024-03-11 PROCEDURE — 99213 OFFICE O/P EST LOW 20 MIN: CPT | Performed by: INTERNAL MEDICINE

## 2024-03-11 RX ORDER — CYCLOBENZAPRINE HCL 10 MG
10 TABLET ORAL 2 TIMES DAILY PRN
Qty: 40 TABLET | Refills: 0 | Status: SHIPPED | OUTPATIENT
Start: 2024-03-11 | End: 2024-03-31

## 2024-03-11 RX ORDER — PREDNISONE 5 MG/1
TABLET ORAL
Qty: 36 TABLET | Refills: 0 | Status: SHIPPED | OUTPATIENT
Start: 2024-03-11

## 2024-03-11 RX ORDER — HYDROCODONE BITARTRATE AND ACETAMINOPHEN 5; 325 MG/1; MG/1
1 TABLET ORAL EVERY 8 HOURS PRN
Qty: 21 TABLET | Refills: 0 | Status: SHIPPED | OUTPATIENT
Start: 2024-03-11 | End: 2024-03-18

## 2024-03-11 ASSESSMENT — PATIENT HEALTH QUESTIONNAIRE - PHQ9
1. LITTLE INTEREST OR PLEASURE IN DOING THINGS: NOT AT ALL
SUM OF ALL RESPONSES TO PHQ QUESTIONS 1-9: 0
SUM OF ALL RESPONSES TO PHQ QUESTIONS 1-9: 0
SUM OF ALL RESPONSES TO PHQ9 QUESTIONS 1 & 2: 0
SUM OF ALL RESPONSES TO PHQ9 QUESTIONS 1 & 2: 0
1. LITTLE INTEREST OR PLEASURE IN DOING THINGS: 0
SUM OF ALL RESPONSES TO PHQ QUESTIONS 1-9: 0
2. FEELING DOWN, DEPRESSED OR HOPELESS: 0
SUM OF ALL RESPONSES TO PHQ QUESTIONS 1-9: 0
2. FEELING DOWN, DEPRESSED OR HOPELESS: NOT AT ALL

## 2024-03-11 NOTE — PROGRESS NOTES
Brennen Bedolla  1990 03/11/24    SUBJECTIVE:    The past week w onset of severe low back pain and spasms.  Pain worse w movement and bending, spasms worst getting up in am.  No trauma or strain- x fell down some stair 3-4 mo ago w no significant pain, landing on his buttocks.  At max is 10/10, lowest 2/10.      Prior visit last mo, no change w zanaflex.  Sl temporary relief w naproxen.        OBJECTIVE:    BP (!) 152/100   Pulse 74   Resp 16   Ht 1.918 m (6' 3.5\")   Wt (!) 181.4 kg (400 lb)   SpO2 98%   BMI 49.34 kg/m²     Physical Exam  Constitutional:       Appearance: Normal appearance. He is obese.   Musculoskeletal:         General: Tenderness present. No swelling.      Lumbar back: Spasms and tenderness present. No swelling. Negative right straight leg raise test and negative left straight leg raise test.        Back:    Neurological:      Mental Status: He is alert.         ASSESSMENT:    1. Acute left-sided low back pain with left-sided sciatica        PLAN:    Brennen was seen today for lower back pain, flank pain and spasms.    Diagnoses and all orders for this visit:    Acute left-sided low back pain with left-sided sciatica- CLINICALLY APPEARS MOST C/W LUMBOSACRAL SPRAIN, BUT ALSO DID PLAY FOOTBALL IN COLLEGE SO AT RISK FOR UNDERLYING DDD, POSSIBLE SPONDYLOLISTHESIS.  TRIAL PRED TAPER, REST, PRN MUSCLE RELAXANT, LIMITED SCRIPT PRN NORCO.  HOWEVER IF SX NO BETTER NEXT 3D THEN PROCEED W XRAY, CONSIDER PT EVAL.   -     cyclobenzaprine (FLEXERIL) 10 MG tablet; Take 1 tablet by mouth 2 times daily as needed for Muscle spasms  -     XR LUMBAR SPINE (2-3 VIEWS); Future  -     HYDROcodone-acetaminophen (NORCO) 5-325 MG per tablet; Take 1 tablet by mouth every 8 hours as needed for Pain for up to 7 days. Intended supply: 7 days. Take lowest dose possible to manage pain Max Daily Amount: 3 tablets  -     predniSONE (DELTASONE) 5 MG tablet; Take 8 pills, then 7,6,5,4,3,2,1

## 2024-03-21 ENCOUNTER — OFFICE VISIT (OUTPATIENT)
Dept: INTERNAL MEDICINE CLINIC | Age: 34
End: 2024-03-21
Payer: COMMERCIAL

## 2024-03-21 VITALS
HEART RATE: 73 BPM | DIASTOLIC BLOOD PRESSURE: 90 MMHG | WEIGHT: 315 LBS | OXYGEN SATURATION: 97 % | SYSTOLIC BLOOD PRESSURE: 144 MMHG | BODY MASS INDEX: 50.77 KG/M2

## 2024-03-21 DIAGNOSIS — E78.2 HYPERLIPEMIA, MIXED: ICD-10-CM

## 2024-03-21 DIAGNOSIS — Z00.00 ROUTINE GENERAL MEDICAL EXAMINATION AT A HEALTH CARE FACILITY: Primary | ICD-10-CM

## 2024-03-21 DIAGNOSIS — I10 ESSENTIAL HYPERTENSION: ICD-10-CM

## 2024-03-21 DIAGNOSIS — M54.42 ACUTE LEFT-SIDED LOW BACK PAIN WITH LEFT-SIDED SCIATICA: ICD-10-CM

## 2024-03-21 DIAGNOSIS — E11.9 TYPE 2 DIABETES MELLITUS WITHOUT COMPLICATION, WITHOUT LONG-TERM CURRENT USE OF INSULIN (HCC): ICD-10-CM

## 2024-03-21 DIAGNOSIS — Z00.00 ENCOUNTER FOR WELL ADULT EXAM WITHOUT ABNORMAL FINDINGS: ICD-10-CM

## 2024-03-21 DIAGNOSIS — Z00.00 ROUTINE GENERAL MEDICAL EXAMINATION AT A HEALTH CARE FACILITY: ICD-10-CM

## 2024-03-21 PROBLEM — E11.65 TYPE 2 DIABETES MELLITUS WITH HYPERGLYCEMIA (HCC): Status: RESOLVED | Noted: 2023-09-21 | Resolved: 2024-03-21

## 2024-03-21 LAB
ALBUMIN SERPL-MCNC: 4.4 G/DL (ref 3.4–5)
ALBUMIN/GLOB SERPL: 1.6 {RATIO} (ref 1.1–2.2)
ALP SERPL-CCNC: 97 U/L (ref 40–129)
ALT SERPL-CCNC: 19 U/L (ref 10–40)
ANION GAP SERPL CALCULATED.3IONS-SCNC: 10 MMOL/L (ref 3–16)
AST SERPL-CCNC: 14 U/L (ref 15–37)
BILIRUB SERPL-MCNC: 0.3 MG/DL (ref 0–1)
BUN SERPL-MCNC: 13 MG/DL (ref 7–20)
CALCIUM SERPL-MCNC: 9.9 MG/DL (ref 8.3–10.6)
CHLORIDE SERPL-SCNC: 101 MMOL/L (ref 99–110)
CHOLEST SERPL-MCNC: 197 MG/DL (ref 0–199)
CO2 SERPL-SCNC: 30 MMOL/L (ref 21–32)
CREAT SERPL-MCNC: 1.1 MG/DL (ref 0.9–1.3)
DEPRECATED RDW RBC AUTO: 14.6 % (ref 12.4–15.4)
GFR SERPLBLD CREATININE-BSD FMLA CKD-EPI: >60 ML/MIN/{1.73_M2}
GLUCOSE SERPL-MCNC: 168 MG/DL (ref 70–99)
HCT VFR BLD AUTO: 44.6 % (ref 40.5–52.5)
HDLC SERPL-MCNC: 34 MG/DL (ref 40–60)
HGB BLD-MCNC: 15.4 G/DL (ref 13.5–17.5)
LDLC SERPL CALC-MCNC: 135 MG/DL
MCH RBC QN AUTO: 28.9 PG (ref 26–34)
MCHC RBC AUTO-ENTMCNC: 34.5 G/DL (ref 31–36)
MCV RBC AUTO: 83.9 FL (ref 80–100)
PLATELET # BLD AUTO: 337 K/UL (ref 135–450)
PMV BLD AUTO: 7.8 FL (ref 5–10.5)
POTASSIUM SERPL-SCNC: 4.1 MMOL/L (ref 3.5–5.1)
PROT SERPL-MCNC: 7.1 G/DL (ref 6.4–8.2)
RBC # BLD AUTO: 5.32 M/UL (ref 4.2–5.9)
SODIUM SERPL-SCNC: 141 MMOL/L (ref 136–145)
TRIGL SERPL-MCNC: 140 MG/DL (ref 0–150)
TSH SERPL DL<=0.005 MIU/L-ACNC: 2.32 UIU/ML (ref 0.27–4.2)
VIT B12 SERPL-MCNC: 442 PG/ML (ref 211–911)
VLDLC SERPL CALC-MCNC: 28 MG/DL
WBC # BLD AUTO: 7.4 K/UL (ref 4–11)

## 2024-03-21 PROCEDURE — 99395 PREV VISIT EST AGE 18-39: CPT | Performed by: INTERNAL MEDICINE

## 2024-03-21 PROCEDURE — 3080F DIAST BP >= 90 MM HG: CPT | Performed by: INTERNAL MEDICINE

## 2024-03-21 PROCEDURE — 3077F SYST BP >= 140 MM HG: CPT | Performed by: INTERNAL MEDICINE

## 2024-03-21 PROCEDURE — 36415 COLL VENOUS BLD VENIPUNCTURE: CPT | Performed by: INTERNAL MEDICINE

## 2024-03-21 RX ORDER — REPAGLINIDE 1 MG/1
1 TABLET ORAL
Qty: 180 TABLET | Refills: 1 | Status: SHIPPED | OUTPATIENT
Start: 2024-03-21 | End: 2024-09-17

## 2024-03-21 RX ORDER — CYCLOBENZAPRINE HCL 10 MG
10 TABLET ORAL 2 TIMES DAILY PRN
Qty: 40 TABLET | Refills: 0 | Status: SHIPPED | OUTPATIENT
Start: 2024-03-21 | End: 2024-04-10

## 2024-03-21 RX ORDER — ATENOLOL AND CHLORTHALIDONE TABLET 100; 25 MG/1; MG/1
1 TABLET ORAL DAILY
Qty: 90 TABLET | Refills: 1 | Status: SHIPPED | OUTPATIENT
Start: 2024-03-21

## 2024-03-21 RX ORDER — GLYBURIDE-METFORMIN HYDROCHLORIDE 5; 500 MG/1; MG/1
1 TABLET ORAL
Qty: 180 TABLET | Refills: 0 | Status: SHIPPED | OUTPATIENT
Start: 2024-03-21 | End: 2024-03-22 | Stop reason: SDUPTHER

## 2024-03-21 RX ORDER — LISINOPRIL 10 MG/1
10 TABLET ORAL DAILY
Qty: 90 TABLET | Refills: 1 | Status: SHIPPED | OUTPATIENT
Start: 2024-03-21

## 2024-03-21 RX ORDER — ROSUVASTATIN CALCIUM 5 MG/1
5 TABLET, COATED ORAL NIGHTLY
Qty: 90 TABLET | Refills: 0 | Status: SHIPPED | OUTPATIENT
Start: 2024-03-21

## 2024-03-21 NOTE — PROGRESS NOTES
sufficient amount for indicated testing frequency plus additional to accommodate PRN testing needs. Yes Garret Ruiz MD   predniSONE (DELTASONE) 5 MG tablet Take 8 pills, then 7,6,5,4,3,2,1  Patient not taking: Reported on 3/21/2024  Garret Ruiz MD   naproxen (NAPROSYN) 500 MG tablet Take 1 tablet by mouth 2 times daily (with meals) for 10 days  Garret Ruiz MD   ondansetron (ZOFRAN) 4 MG tablet Take 1 tablet by mouth 3 times daily as needed for Nausea or Vomiting  Patient not taking: Reported on 3/21/2024  Garret Ruiz MD         Past Medical History:   Diagnosis Date    DM (diabetes mellitus), type 2 (HCC) 06/20/2023    HGB A1C 10.0./[facility]    Hypertension     Prediabetes 08/06/2020    hgb a1c 6.2.    Type 2 diabetes mellitus with hyperglycemia 9/21/2023       No past surgical history on file.      Family History   Problem Relation Age of Onset    Diabetes Mother     High Cholesterol Mother     Hypertension Mother     Diabetes Father     High Cholesterol Father     Hypertension Father        Social History     Tobacco Use    Smoking status: Never    Smokeless tobacco: Never   Substance Use Topics    Alcohol use: Yes     Comment: Occasional    Drug use: No       Objective     Vital Signs  BP (!) 144/90 (Site: Left Upper Arm, Position: Sitting, Cuff Size: Large Adult)   Pulse 73   Wt (!) 186.7 kg (411 lb 9.6 oz)   SpO2 97%   BMI 50.77 kg/m²   Wt Readings from Last 3 Encounters:   03/21/24 (!) 186.7 kg (411 lb 9.6 oz)   03/11/24 (!) 181.4 kg (400 lb)   01/02/24 (!) 182.3 kg (402 lb)         Physical Exam  Vitals reviewed.   Constitutional:       General: He is not in acute distress.     Appearance: He is well-developed.   HENT:      Head: Normocephalic and atraumatic.      Mouth/Throat:      Mouth: Mucous membranes are moist.      Pharynx: Oropharynx is clear. No oropharyngeal exudate or posterior oropharyngeal erythema.   Eyes:      General: No scleral icterus.

## 2024-03-22 DIAGNOSIS — E11.9 TYPE 2 DIABETES MELLITUS WITHOUT COMPLICATION, WITHOUT LONG-TERM CURRENT USE OF INSULIN (HCC): ICD-10-CM

## 2024-03-22 LAB
EST. AVERAGE GLUCOSE BLD GHB EST-MCNC: 211.6 MG/DL
HBA1C MFR BLD: 9 %

## 2024-03-22 RX ORDER — GLYBURIDE-METFORMIN HYDROCHLORIDE 5; 500 MG/1; MG/1
1 TABLET ORAL 2 TIMES DAILY WITH MEALS
Qty: 180 TABLET | Refills: 1 | Status: SHIPPED | OUTPATIENT
Start: 2024-03-22

## 2024-03-22 NOTE — RESULT ENCOUNTER NOTE
Please call pt, chol, lab ok x sugars are high.  Confirm dose of metformin/glipizide- if taking 1/day, incr to twice/day.  HOWEVER IF ALREADY ON TWICE/DAY, WE'LL NEED TO INCR TO 2 TABS TWICE/DAY.    I SENT NEW SCRIPT FOR BID, SO IF NEEDS 2 TABS BID I'LL NEED TO SEND ANOTHER SCRIPT

## 2024-04-20 DIAGNOSIS — I10 ESSENTIAL HYPERTENSION: ICD-10-CM

## 2024-04-22 RX ORDER — ATENOLOL AND CHLORTHALIDONE TABLET 100; 25 MG/1; MG/1
1 TABLET ORAL DAILY
Qty: 90 TABLET | Refills: 1 | Status: SHIPPED | OUTPATIENT
Start: 2024-04-22

## 2024-05-16 ENCOUNTER — OFFICE VISIT (OUTPATIENT)
Dept: INTERNAL MEDICINE CLINIC | Age: 34
End: 2024-05-16
Payer: COMMERCIAL

## 2024-05-16 VITALS
HEART RATE: 80 BPM | OXYGEN SATURATION: 93 % | DIASTOLIC BLOOD PRESSURE: 80 MMHG | HEIGHT: 76 IN | WEIGHT: 315 LBS | SYSTOLIC BLOOD PRESSURE: 120 MMHG | BODY MASS INDEX: 38.36 KG/M2

## 2024-05-16 DIAGNOSIS — K29.00 ACUTE GASTRITIS WITHOUT HEMORRHAGE, UNSPECIFIED GASTRITIS TYPE: ICD-10-CM

## 2024-05-16 DIAGNOSIS — K52.9 ACUTE GASTROENTERITIS: ICD-10-CM

## 2024-05-16 DIAGNOSIS — R10.84 ABDOMINAL PAIN, GENERALIZED: Primary | ICD-10-CM

## 2024-05-16 PROCEDURE — 3079F DIAST BP 80-89 MM HG: CPT | Performed by: INTERNAL MEDICINE

## 2024-05-16 PROCEDURE — 3074F SYST BP LT 130 MM HG: CPT | Performed by: INTERNAL MEDICINE

## 2024-05-16 PROCEDURE — 99214 OFFICE O/P EST MOD 30 MIN: CPT | Performed by: INTERNAL MEDICINE

## 2024-05-16 RX ORDER — CIPROFLOXACIN 250 MG/1
250 TABLET, FILM COATED ORAL 2 TIMES DAILY
Qty: 10 TABLET | Refills: 0 | Status: SHIPPED | OUTPATIENT
Start: 2024-05-16 | End: 2024-05-21

## 2024-05-16 RX ORDER — DIPHENOXYLATE HYDROCHLORIDE AND ATROPINE SULFATE 2.5; .025 MG/1; MG/1
1 TABLET ORAL DAILY PRN
Qty: 7 TABLET | Refills: 0 | Status: SHIPPED | OUTPATIENT
Start: 2024-05-16 | End: 2024-05-23

## 2024-05-16 RX ORDER — PANTOPRAZOLE SODIUM 40 MG/1
40 TABLET, DELAYED RELEASE ORAL
Qty: 30 TABLET | Refills: 0 | Status: SHIPPED | OUTPATIENT
Start: 2024-05-16

## 2024-05-16 SDOH — ECONOMIC STABILITY: FOOD INSECURITY: WITHIN THE PAST 12 MONTHS, YOU WORRIED THAT YOUR FOOD WOULD RUN OUT BEFORE YOU GOT MONEY TO BUY MORE.: SOMETIMES TRUE

## 2024-05-16 SDOH — ECONOMIC STABILITY: INCOME INSECURITY: HOW HARD IS IT FOR YOU TO PAY FOR THE VERY BASICS LIKE FOOD, HOUSING, MEDICAL CARE, AND HEATING?: SOMEWHAT HARD

## 2024-05-16 SDOH — ECONOMIC STABILITY: FOOD INSECURITY: WITHIN THE PAST 12 MONTHS, THE FOOD YOU BOUGHT JUST DIDN'T LAST AND YOU DIDN'T HAVE MONEY TO GET MORE.: NEVER TRUE

## 2024-05-16 SDOH — ECONOMIC STABILITY: TRANSPORTATION INSECURITY
IN THE PAST 12 MONTHS, HAS LACK OF TRANSPORTATION KEPT YOU FROM MEETINGS, WORK, OR FROM GETTING THINGS NEEDED FOR DAILY LIVING?: NO

## 2024-05-16 NOTE — PATIENT INSTRUCTIONS
Suspect your abd pain may be from food poisoning or infection, vs gastritis induced by the naproxen, vs reflux.    We'll try antacid protonix, also limited script as needed lomotil for diarrhea and lastly an antibiotic cipro for 7d.    IF you are no better middle of next week please check lab

## 2024-05-16 NOTE — PROGRESS NOTES
fluids, rest and bland diet, then to call back one week if not improving.      -     C-Reactive Protein; Future  -     CBC with Auto Differential; Future  -     Comprehensive Metabolic Panel; Future  -     diphenoxylate-atropine (LOMOTIL) 2.5-0.025 MG per tablet; Take 1 tablet by mouth daily as needed for Diarrhea for up to 7 days. Max Daily Amount: 1 tablet    Acute gastroenteritis - Plan as noted    -     CBC with Auto Differential; Future  -     Comprehensive Metabolic Panel; Future  -     ciprofloxacin (CIPRO) 250 MG tablet; Take 1 tablet by mouth 2 times daily for 5 days  -     diphenoxylate-atropine (LOMOTIL) 2.5-0.025 MG per tablet; Take 1 tablet by mouth daily as needed for Diarrhea for up to 7 days. Max Daily Amount: 1 tablet    Acute gastritis without hemorrhage, unspecified gastritis type - Plan as noted    -     CBC with Auto Differential; Future  -     Comprehensive Metabolic Panel; Future  -     pantoprazole (PROTONIX) 40 MG tablet; Take 1 tablet by mouth every morning (before breakfast)

## 2024-05-25 DIAGNOSIS — E11.9 TYPE 2 DIABETES MELLITUS WITHOUT COMPLICATION, WITHOUT LONG-TERM CURRENT USE OF INSULIN (HCC): ICD-10-CM

## 2024-05-28 RX ORDER — GLYBURIDE-METFORMIN HYDROCHLORIDE 5; 500 MG/1; MG/1
1 TABLET ORAL 2 TIMES DAILY WITH MEALS
Qty: 180 TABLET | Refills: 1 | Status: SHIPPED | OUTPATIENT
Start: 2024-05-28

## 2024-06-02 DIAGNOSIS — I10 ESSENTIAL HYPERTENSION: ICD-10-CM

## 2024-06-03 DIAGNOSIS — Z00.00 PREVENTATIVE HEALTH CARE: Primary | ICD-10-CM

## 2024-06-03 RX ORDER — ATENOLOL AND CHLORTHALIDONE TABLET 100; 25 MG/1; MG/1
1 TABLET ORAL DAILY
Qty: 90 TABLET | Refills: 1 | Status: SHIPPED | OUTPATIENT
Start: 2024-06-03

## 2024-06-05 ENCOUNTER — OFFICE VISIT (OUTPATIENT)
Dept: INTERNAL MEDICINE CLINIC | Age: 34
End: 2024-06-05
Payer: COMMERCIAL

## 2024-06-05 VITALS
OXYGEN SATURATION: 100 % | HEART RATE: 79 BPM | SYSTOLIC BLOOD PRESSURE: 123 MMHG | WEIGHT: 315 LBS | HEIGHT: 76 IN | BODY MASS INDEX: 38.36 KG/M2 | DIASTOLIC BLOOD PRESSURE: 74 MMHG | RESPIRATION RATE: 16 BRPM

## 2024-06-05 DIAGNOSIS — I10 ESSENTIAL HYPERTENSION: ICD-10-CM

## 2024-06-05 DIAGNOSIS — Z00.00 ENCOUNTER FOR WELL ADULT EXAM WITHOUT ABNORMAL FINDINGS: ICD-10-CM

## 2024-06-05 DIAGNOSIS — E11.9 TYPE 2 DIABETES MELLITUS WITHOUT COMPLICATION, WITHOUT LONG-TERM CURRENT USE OF INSULIN (HCC): ICD-10-CM

## 2024-06-05 DIAGNOSIS — Z00.00 ROUTINE GENERAL MEDICAL EXAMINATION AT A HEALTH CARE FACILITY: Primary | ICD-10-CM

## 2024-06-05 PROCEDURE — 3074F SYST BP LT 130 MM HG: CPT | Performed by: INTERNAL MEDICINE

## 2024-06-05 PROCEDURE — 3078F DIAST BP <80 MM HG: CPT | Performed by: INTERNAL MEDICINE

## 2024-06-05 PROCEDURE — 99395 PREV VISIT EST AGE 18-39: CPT | Performed by: INTERNAL MEDICINE

## 2024-06-05 RX ORDER — REPAGLINIDE 2 MG/1
2 TABLET ORAL
Qty: 180 TABLET | Refills: 1 | Status: SHIPPED | OUTPATIENT
Start: 2024-06-05 | End: 2024-12-02

## 2024-06-05 RX ORDER — AMLODIPINE BESYLATE 5 MG/1
5 TABLET ORAL DAILY
Qty: 90 TABLET | Refills: 1 | Status: SHIPPED | OUTPATIENT
Start: 2024-06-05

## 2024-06-05 RX ORDER — ROSUVASTATIN CALCIUM 5 MG/1
5 TABLET, COATED ORAL NIGHTLY
Qty: 90 TABLET | Refills: 1 | Status: SHIPPED | OUTPATIENT
Start: 2024-06-05

## 2024-06-05 NOTE — PROGRESS NOTES
Well Adult Note  Name: Brennen Bedolla Today’s Date: 2024   MRN: [id number] Sex: Male   Age: 33 y.o. Ethnicity: Non- / Non    : 1990 Race: Black /       Brennen Bedolla is here for well adult exam.  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    Hypertension: Stable. Denies CP, SOB, cough, visual changes, dizziness, palpitations or HA.      DM- last A1c was sl high, at home now running ~120s  Lab Results   Component Value Date    LABA1C 9.0 2024    LABA1C 8.1 2023    LABA1C 8.7 2023     Lab Results   Component Value Date    MALBCR 14.0 2023    CREATININE 1.1 2024           Review of Systems    Allergies   Allergen Reactions    Lipitor [Atorvastatin]      Gi upset and sweats    Metronidazole      Severe gi upset         Prior to Visit Medications    Medication Sig Taking? Authorizing Provider   atenolol-chlorthalidone (TENORETIC) 100-25 MG per tablet Take 1 tablet by mouth daily Yes Garret Ruiz MD   glyBURIDE-metFORMIN (GLUCOVANCE) 5-500 MG per tablet Take 1 tablet by mouth 2 times daily (with meals) Yes Binu Tejada MD   pantoprazole (PROTONIX) 40 MG tablet Take 1 tablet by mouth every morning (before breakfast) Yes Garret Ruiz MD   lisinopril (PRINIVIL;ZESTRIL) 10 MG tablet Take 1 tablet by mouth daily Yes Garret Ruiz MD   repaglinide (PRANDIN) 1 MG tablet Take 1 tablet by mouth 2 times daily (before meals) Yes Garret Ruiz MD   rosuvastatin (CRESTOR) 5 MG tablet Take 1 tablet by mouth nightly Yes Garret Ruiz MD   amLODIPine (NORVASC) 5 MG tablet Take 1 tablet by mouth daily Yes Garret Ruiz MD   Lancets MISC 1 each by Does not apply route daily Yes Garret Ruiz MD   blood glucose monitor strips Test 1 times a day & as needed for symptoms of irregular blood glucose. Dispense sufficient amount for indicated testing frequency plus

## 2024-06-13 LAB
INTERPRETATION: NEGATIVE
QUANTIFERON (R) TB GOLD (INCUBATED): <0.35 IU/ML

## 2024-07-10 DIAGNOSIS — I10 ESSENTIAL HYPERTENSION: ICD-10-CM

## 2024-07-10 RX ORDER — ATENOLOL AND CHLORTHALIDONE TABLET 100; 25 MG/1; MG/1
1 TABLET ORAL DAILY
Qty: 90 TABLET | Refills: 1 | Status: SHIPPED | OUTPATIENT
Start: 2024-07-10

## 2024-07-12 DIAGNOSIS — K29.00 ACUTE GASTRITIS WITHOUT HEMORRHAGE, UNSPECIFIED GASTRITIS TYPE: ICD-10-CM

## 2024-07-12 LAB — GLUCOSE BLD-MCNC: 257 MG/DL (ref 70–99)

## 2024-07-12 PROCEDURE — 82962 GLUCOSE BLOOD TEST: CPT

## 2024-07-12 PROCEDURE — 80053 COMPREHEN METABOLIC PANEL: CPT

## 2024-07-12 PROCEDURE — 99285 EMERGENCY DEPT VISIT HI MDM: CPT

## 2024-07-12 PROCEDURE — 85025 COMPLETE CBC W/AUTO DIFF WBC: CPT

## 2024-07-13 ENCOUNTER — HOSPITAL ENCOUNTER (EMERGENCY)
Age: 34
Discharge: HOME | End: 2024-07-13
Attending: EMERGENCY MEDICINE
Payer: COMMERCIAL

## 2024-07-13 ENCOUNTER — APPOINTMENT (OUTPATIENT)
Dept: CT IMAGING | Age: 34
End: 2024-07-13
Payer: COMMERCIAL

## 2024-07-13 VITALS
DIASTOLIC BLOOD PRESSURE: 109 MMHG | RESPIRATION RATE: 18 BRPM | HEART RATE: 86 BPM | BODY MASS INDEX: 38.36 KG/M2 | OXYGEN SATURATION: 98 % | WEIGHT: 315 LBS | HEIGHT: 76 IN | TEMPERATURE: 98.9 F | SYSTOLIC BLOOD PRESSURE: 149 MMHG

## 2024-07-13 DIAGNOSIS — R42 DIZZINESS: ICD-10-CM

## 2024-07-13 DIAGNOSIS — R42 LIGHTHEADEDNESS: Primary | ICD-10-CM

## 2024-07-13 DIAGNOSIS — R73.9 HYPERGLYCEMIA: ICD-10-CM

## 2024-07-13 LAB
ALBUMIN SERPL-MCNC: 4.6 GM/DL (ref 3.4–5)
ALP BLD-CCNC: 103 IU/L (ref 40–129)
ALT SERPL-CCNC: 36 U/L (ref 10–40)
ANION GAP SERPL CALCULATED.3IONS-SCNC: 13 MMOL/L (ref 7–16)
AST SERPL-CCNC: 26 IU/L (ref 15–37)
BASOPHILS ABSOLUTE: 0 K/CU MM
BASOPHILS RELATIVE PERCENT: 0.3 % (ref 0–1)
BILIRUB SERPL-MCNC: 0.2 MG/DL (ref 0–1)
BUN SERPL-MCNC: 17 MG/DL (ref 6–23)
CALCIUM SERPL-MCNC: 9.6 MG/DL (ref 8.3–10.6)
CHLORIDE BLD-SCNC: 103 MMOL/L (ref 99–110)
CO2: 22 MMOL/L (ref 21–32)
CREAT SERPL-MCNC: 0.9 MG/DL (ref 0.9–1.3)
DIFFERENTIAL TYPE: ABNORMAL
EKG ATRIAL RATE: 78 BPM
EKG DIAGNOSIS: NORMAL
EKG P AXIS: 13 DEGREES
EKG P-R INTERVAL: 164 MS
EKG Q-T INTERVAL: 410 MS
EKG QRS DURATION: 94 MS
EKG QTC CALCULATION (BAZETT): 467 MS
EKG R AXIS: 4 DEGREES
EKG T AXIS: 17 DEGREES
EKG VENTRICULAR RATE: 78 BPM
EOSINOPHILS ABSOLUTE: 0.5 K/CU MM
EOSINOPHILS RELATIVE PERCENT: 4.6 % (ref 0–3)
GFR, ESTIMATED: >90 ML/MIN/1.73M2
GLUCOSE SERPL-MCNC: 277 MG/DL (ref 70–99)
HCT VFR BLD CALC: 48.2 % (ref 42–52)
HEMOGLOBIN: 16.1 GM/DL (ref 13.5–18)
IMMATURE NEUTROPHIL %: 0.3 % (ref 0–0.43)
LYMPHOCYTES ABSOLUTE: 2.1 K/CU MM
LYMPHOCYTES RELATIVE PERCENT: 20.8 % (ref 24–44)
MCH RBC QN AUTO: 28.6 PG (ref 27–31)
MCHC RBC AUTO-ENTMCNC: 33.4 % (ref 32–36)
MCV RBC AUTO: 85.6 FL (ref 78–100)
MONOCYTES ABSOLUTE: 0.6 K/CU MM
MONOCYTES RELATIVE PERCENT: 6 % (ref 0–4)
NEUTROPHILS ABSOLUTE: 6.8 K/CU MM
NEUTROPHILS RELATIVE PERCENT: 68 % (ref 36–66)
NUCLEATED RBC %: 0 %
PDW BLD-RTO: 13.3 % (ref 11.7–14.9)
PLATELET # BLD: 356 K/CU MM (ref 140–440)
PMV BLD AUTO: 9.2 FL (ref 7.5–11.1)
POTASSIUM SERPL-SCNC: 3.4 MMOL/L (ref 3.5–5.1)
RBC # BLD: 5.63 M/CU MM (ref 4.6–6.2)
SODIUM BLD-SCNC: 138 MMOL/L (ref 135–145)
TOTAL IMMATURE NEUTOROPHIL: 0.03 K/CU MM
TOTAL NUCLEATED RBC: 0 K/CU MM
TOTAL PROTEIN: 7.9 GM/DL (ref 6.4–8.2)
WBC # BLD: 10 K/CU MM (ref 4–10.5)

## 2024-07-13 PROCEDURE — 70450 CT HEAD/BRAIN W/O DYE: CPT

## 2024-07-13 PROCEDURE — 6360000004 HC RX CONTRAST MEDICATION: Performed by: EMERGENCY MEDICINE

## 2024-07-13 PROCEDURE — 6370000000 HC RX 637 (ALT 250 FOR IP): Performed by: EMERGENCY MEDICINE

## 2024-07-13 PROCEDURE — 70498 CT ANGIOGRAPHY NECK: CPT

## 2024-07-13 PROCEDURE — 2580000003 HC RX 258: Performed by: EMERGENCY MEDICINE

## 2024-07-13 PROCEDURE — 93005 ELECTROCARDIOGRAM TRACING: CPT | Performed by: EMERGENCY MEDICINE

## 2024-07-13 PROCEDURE — 93010 ELECTROCARDIOGRAM REPORT: CPT | Performed by: INTERNAL MEDICINE

## 2024-07-13 RX ORDER — MECLIZINE HYDROCHLORIDE 25 MG/1
25 TABLET ORAL 3 TIMES DAILY PRN
Qty: 30 TABLET | Refills: 0 | Status: SHIPPED | OUTPATIENT
Start: 2024-07-13 | End: 2024-07-23

## 2024-07-13 RX ORDER — 0.9 % SODIUM CHLORIDE 0.9 %
1000 INTRAVENOUS SOLUTION INTRAVENOUS ONCE
Status: COMPLETED | OUTPATIENT
Start: 2024-07-13 | End: 2024-07-13

## 2024-07-13 RX ORDER — SODIUM CHLORIDE 0.9 % (FLUSH) 0.9 %
5-40 SYRINGE (ML) INJECTION 2 TIMES DAILY
Status: DISCONTINUED | OUTPATIENT
Start: 2024-07-13 | End: 2024-07-13 | Stop reason: HOSPADM

## 2024-07-13 RX ORDER — MECLIZINE HYDROCHLORIDE 25 MG/1
25 TABLET ORAL ONCE
Status: COMPLETED | OUTPATIENT
Start: 2024-07-13 | End: 2024-07-13

## 2024-07-13 RX ADMIN — IOPAMIDOL 80 ML: 755 INJECTION, SOLUTION INTRAVENOUS at 07:20

## 2024-07-13 RX ADMIN — SODIUM CHLORIDE 1000 ML: 9 INJECTION, SOLUTION INTRAVENOUS at 05:35

## 2024-07-13 RX ADMIN — MECLIZINE HYDROCHLORIDE 25 MG: 25 TABLET ORAL at 04:28

## 2024-07-13 NOTE — ED NOTES
Pt ambulated up and down the hallway, pt tolerated well, denying any s/s of distress or dizziness.

## 2024-07-13 NOTE — DISCHARGE INSTRUCTIONS
Your imaging today was overall nonacute.  Please help with your primary care physician for further evaluation management.    Your blood sugar was elevated in the emergency department.    If you develop any worsening or concerning symptoms, please seek immediate medical evaluation

## 2024-07-13 NOTE — ED PROVIDER NOTES
[facility] EMERGENCY DEPARTMENT  EMERGENCY DEPARTMENT ENCOUNTER      Pt Name: Brennen Bedolla  MRN: [id number]  Birthdate 1990  Date of evaluation: 7/12/2024  Provider: Farideh Collado MD    CHIEF COMPLAINT       Chief Complaint   Patient presents with    Hyperglycemia     303 at work    Dizziness         HISTORY OF PRESENT ILLNESS      Brennen Bedolla is a 33 y.o. male who presents to the emergency department  for   Chief Complaint   Patient presents with    Hyperglycemia     303 at work    Dizziness       33-year-old male presents complaining some dizziness and lightheadedness.  Started about 9 PM when he was at work.  Does state that was a bit of a stressful day at work.  Does have a history of high blood pressure and diabetes.  Denies any history of stroke or TIA.  He was not having any focal deficits like focal weakness or paresthesias.  No changes in vision.  No trouble swallowing or speaking.  He presents with a GCS of 15 and stroke scale of 0.  He does present outside of the window for TNK.  Because of this and a stress close your stroke alert was not called.  He states his dizziness has improved somewhat.  He does endorse a remote history of having some dizziness does not recall if you underwent any formal workup.          Nursing Notes, Triage Notes & Vital Signs were reviewed.      REVIEW OF SYSTEMS    (2-9 systems for level 4, 10 or more for level 5)     Review of Systems   Neurological:  Positive for dizziness and light-headedness.       Except as noted above the remainder of the review of systems was reviewed and negative.       PAST MEDICAL HISTORY     Past Medical History:   Diagnosis Date    DM (diabetes mellitus), type 2 (HCC) 06/20/2023    HGB A1C 10.0./[facility]    Hypertension     Prediabetes 08/06/2020    hgb a1c 6.2.    Type 2 diabetes mellitus with hyperglycemia 9/21/2023       Prior to Admission medications    Medication Sig Start  efforts were made to discuss any incidental findings that require further monitoring.      Controlled Substances Monitoring:          No data to display                (Please note that portions of this note were completed with a voice recognition program.  Efforts were made to edit the dictations but occasionally words are mis-transcribed.)    Farideh Collado MD (electronically signed)  Attending Emergency Physician

## 2024-07-15 ENCOUNTER — TELEPHONE (OUTPATIENT)
Dept: INTERNAL MEDICINE CLINIC | Age: 34
End: 2024-07-15

## 2024-07-15 RX ORDER — PANTOPRAZOLE SODIUM 40 MG/1
40 TABLET, DELAYED RELEASE ORAL
Qty: 30 TABLET | Refills: 0 | Status: SHIPPED | OUTPATIENT
Start: 2024-07-15 | End: 2024-07-16 | Stop reason: SDUPTHER

## 2024-07-15 NOTE — TELEPHONE ENCOUNTER
----- Message from Garret Ruiz MD sent at 7/14/2024  4:31 PM EDT -----  PLS CALL ELLIOTT, HE WAS IN THE ED OVER THE WEEKEND AND BOTH SUGAR AND BP WERE SL HIGH.  IS HE OK AND ARE BP AND SUGARS OK?  PLS LET ME KNOW IF HE'S NOT WELL CONTROLLED OR STILL HAVING SX.  IF NEEDED WE CAN GET HIM IN TO OFFICE TO SEE ME THIS WEEK  ----- Message -----  From: Farideh Benites MD  Sent: 7/13/2024   8:25 PM EDT  To: Garret Ruiz MD

## 2024-07-16 ENCOUNTER — OFFICE VISIT (OUTPATIENT)
Dept: INTERNAL MEDICINE CLINIC | Age: 34
End: 2024-07-16
Payer: COMMERCIAL

## 2024-07-16 VITALS
DIASTOLIC BLOOD PRESSURE: 99 MMHG | BODY MASS INDEX: 38.36 KG/M2 | HEART RATE: 97 BPM | OXYGEN SATURATION: 94 % | WEIGHT: 315 LBS | SYSTOLIC BLOOD PRESSURE: 146 MMHG | HEIGHT: 76 IN

## 2024-07-16 DIAGNOSIS — K29.00 ACUTE GASTRITIS WITHOUT HEMORRHAGE, UNSPECIFIED GASTRITIS TYPE: ICD-10-CM

## 2024-07-16 DIAGNOSIS — F41.9 ANXIETY: ICD-10-CM

## 2024-07-16 DIAGNOSIS — I10 ESSENTIAL HYPERTENSION: Primary | ICD-10-CM

## 2024-07-16 DIAGNOSIS — E11.9 TYPE 2 DIABETES MELLITUS WITHOUT COMPLICATION, WITHOUT LONG-TERM CURRENT USE OF INSULIN (HCC): ICD-10-CM

## 2024-07-16 PROCEDURE — 3077F SYST BP >= 140 MM HG: CPT | Performed by: INTERNAL MEDICINE

## 2024-07-16 PROCEDURE — 99214 OFFICE O/P EST MOD 30 MIN: CPT | Performed by: INTERNAL MEDICINE

## 2024-07-16 PROCEDURE — 3080F DIAST BP >= 90 MM HG: CPT | Performed by: INTERNAL MEDICINE

## 2024-07-16 PROCEDURE — 3052F HG A1C>EQUAL 8.0%<EQUAL 9.0%: CPT | Performed by: INTERNAL MEDICINE

## 2024-07-16 RX ORDER — BUSPIRONE HYDROCHLORIDE 10 MG/1
10 TABLET ORAL 2 TIMES DAILY PRN
Qty: 180 TABLET | Refills: 1 | Status: SHIPPED | OUTPATIENT
Start: 2024-07-16 | End: 2025-01-12

## 2024-07-16 RX ORDER — REPAGLINIDE 2 MG/1
2 TABLET ORAL
Qty: 270 TABLET | Refills: 1 | Status: SHIPPED | OUTPATIENT
Start: 2024-07-16 | End: 2025-01-12

## 2024-07-16 RX ORDER — PANTOPRAZOLE SODIUM 40 MG/1
40 TABLET, DELAYED RELEASE ORAL
Qty: 90 TABLET | Refills: 1 | Status: SHIPPED | OUTPATIENT
Start: 2024-07-16

## 2024-07-16 RX ORDER — LISINOPRIL 20 MG/1
20 TABLET ORAL DAILY
Qty: 90 TABLET | Refills: 1 | Status: SHIPPED | OUTPATIENT
Start: 2024-07-16

## 2024-07-16 NOTE — PROGRESS NOTES
of systems was reviewed and negative.         PAST MEDICAL HISTORY      Past Medical History        Past Medical History:   Diagnosis Date    DM (diabetes mellitus), type 2 (HCC) 06/20/2023     HGB A1C 10.0./[facility]    Hypertension      Prediabetes 08/06/2020     hgb a1c 6.2.    Type 2 diabetes mellitus with hyperglycemia 9/21/2023            Home Medications           Prior to Admission medications    Medication Sig Start Date End Date Taking? Authorizing Provider   atenolol-chlorthalidone (TENORETIC) 100-25 MG per tablet Take 1 tablet by mouth daily 7/10/24     Garret Ruiz MD   amLODIPine (NORVASC) 5 MG tablet Take 1 tablet by mouth daily 6/5/24     Garret Ruiz MD   rosuvastatin (CRESTOR) 5 MG tablet Take 1 tablet by mouth nightly 6/5/24     Garret Ruiz MD   repaglinide (PRANDIN) 2 MG tablet Take 1 tablet by mouth 2 times daily (before meals) 6/5/24 12/2/24   Garret Ruiz MD   glyBURIDE-metFORMIN (GLUCOVANCE) 5-500 MG per tablet Take 1 tablet by mouth 2 times daily (with meals) 5/28/24     Binu Tejada MD   pantoprazole (PROTONIX) 40 MG tablet Take 1 tablet by mouth every morning (before breakfast) 5/16/24     Garret Ruiz MD   lisinopril (PRINIVIL;ZESTRIL) 10 MG tablet Take 1 tablet by mouth daily 3/21/24     Garret Ruiz MD   Lancets MISC 1 each by Does not apply route daily 6/20/23     Garret Ruiz MD   blood glucose monitor strips Test 1 times a day & as needed for symptoms of irregular blood glucose. Dispense sufficient amount for indicated testing frequency plus additional to accommodate PRN testing needs. 6/20/23     Garret Ruiz MD                Patient Active Problem List   Diagnosis    Hypertension    Seborrheic dermatitis    Hyperpigmentation    Prediabetes    Snoring    Hypersomnolence    BUBBA (obstructive sleep apnea)    Body mass index (BMI) 50.0-59.9, adult (HCC)    DM (diabetes mellitus), type 2 (HCC)

## 2024-07-16 NOTE — PATIENT INSTRUCTIONS
Decr prandin to 1mg 3x/day w meals    Incr lisinopril to 20mg daily.      Buspar is 1-2x/day as needed for anxiety but eventurally rec to get to 1 every morning then second tab in afternoon or evening if stress/anxiety still high

## 2024-07-23 LAB — DIABETIC RETINOPATHY: NEGATIVE

## 2024-08-28 ENCOUNTER — PATIENT MESSAGE (OUTPATIENT)
Dept: INTERNAL MEDICINE CLINIC | Age: 34
End: 2024-08-28

## 2024-08-28 RX ORDER — PIOGLITAZONEHYDROCHLORIDE 30 MG/1
30 TABLET ORAL DAILY
Qty: 90 TABLET | Refills: 1 | Status: SHIPPED | OUTPATIENT
Start: 2024-08-28

## 2024-08-30 DIAGNOSIS — I10 ESSENTIAL HYPERTENSION: ICD-10-CM

## 2024-08-30 RX ORDER — ATENOLOL AND CHLORTHALIDONE TABLET 100; 25 MG/1; MG/1
1 TABLET ORAL DAILY
Qty: 90 TABLET | Refills: 1 | Status: SHIPPED | OUTPATIENT
Start: 2024-08-30

## 2024-08-30 RX ORDER — ATENOLOL AND CHLORTHALIDONE TABLET 100; 25 MG/1; MG/1
1 TABLET ORAL DAILY
Qty: 90 TABLET | Refills: 1 | OUTPATIENT
Start: 2024-08-30

## 2024-09-03 DIAGNOSIS — I10 ESSENTIAL HYPERTENSION: ICD-10-CM

## 2024-09-03 RX ORDER — ATENOLOL AND CHLORTHALIDONE TABLET 50; 25 MG/1; MG/1
1 TABLET ORAL DAILY
Qty: 90 TABLET | Refills: 1 | Status: SHIPPED | OUTPATIENT
Start: 2024-09-03

## 2024-09-03 RX ORDER — ATENOLOL AND CHLORTHALIDONE TABLET 100; 25 MG/1; MG/1
1 TABLET ORAL DAILY
Qty: 90 TABLET | Refills: 1 | OUTPATIENT
Start: 2024-09-03

## 2024-10-16 DIAGNOSIS — I10 ESSENTIAL HYPERTENSION: ICD-10-CM

## 2024-10-16 RX ORDER — ATENOLOL AND CHLORTHALIDONE TABLET 100; 25 MG/1; MG/1
1 TABLET ORAL DAILY
Qty: 90 TABLET | Refills: 1 | Status: SHIPPED | OUTPATIENT
Start: 2024-10-16

## 2024-10-16 NOTE — TELEPHONE ENCOUNTER
Patient comment: It's says 90 tablets but I've only been receiving 30 tablets when going to pharmacy. I'm not sure what's going on.

## 2024-11-20 ENCOUNTER — OFFICE VISIT (OUTPATIENT)
Dept: INTERNAL MEDICINE CLINIC | Age: 34
End: 2024-11-20

## 2024-11-20 VITALS
DIASTOLIC BLOOD PRESSURE: 80 MMHG | SYSTOLIC BLOOD PRESSURE: 130 MMHG | HEART RATE: 73 BPM | WEIGHT: 315 LBS | HEIGHT: 76 IN | BODY MASS INDEX: 38.36 KG/M2 | OXYGEN SATURATION: 95 %

## 2024-11-20 DIAGNOSIS — H60.392 OTHER INFECTIVE ACUTE OTITIS EXTERNA OF LEFT EAR: Primary | ICD-10-CM

## 2024-11-20 DIAGNOSIS — H61.23 BILATERAL IMPACTED CERUMEN: ICD-10-CM

## 2024-11-20 RX ORDER — NEOMYCIN SULFATE, POLYMYXIN B SULFATE, HYDROCORTISONE 3.5; 10000; 1 MG/ML; [USP'U]/ML; MG/ML
4 SOLUTION/ DROPS AURICULAR (OTIC) 3 TIMES DAILY
Qty: 10 ML | Refills: 0 | Status: SHIPPED | OUTPATIENT
Start: 2024-11-20 | End: 2024-11-30

## 2024-11-20 NOTE — PROGRESS NOTES
Brennen Bedolla  1990 11/20/24    SUBJECTIVE:    3d hx of worsening L earache, sl muffled hearing, no fevers.    Bilat cerumen impaction noted    OBJECTIVE:    /80 (Site: Left Upper Arm, Position: Sitting, Cuff Size: Medium Adult)   Pulse 73   Ht 1.93 m (6' 3.98\")   Wt (!) 172.8 kg (381 lb)   SpO2 95%   BMI 46.40 kg/m²     Physical Exam  Constitutional:       Appearance: Normal appearance.   HENT:      Right Ear: There is impacted cerumen.      Left Ear: Tympanic membrane, ear canal and external ear normal. There is impacted cerumen.   Neurological:      Mental Status: He is alert.         ASSESSMENT:    1. Other infective acute otitis externa of left ear    2. Bilateral impacted cerumen        PLAN:  Performed disimpaction bilat ears w curette and irrigation.  L>R canal w some erythema and light drainage.  Plan treat w course of abx drops, to call back one week if not improving.    Also for prophylaxis of further impactions suggested trying monthly 3 drops of debrox to both ears.  Brennen was seen today for ear pain.    Diagnoses and all orders for this visit:    Other infective acute otitis externa of left ear  -     neomycin-polymyxin-hydrocortisone (CORTISPORIN) 3.5-16990-1 otic solution; Place 4 drops into both ears 3 times daily for 10 days Instill into both Ears    Bilateral impacted cerumen

## 2024-12-02 DIAGNOSIS — I10 ESSENTIAL HYPERTENSION: ICD-10-CM

## 2024-12-02 RX ORDER — ATENOLOL AND CHLORTHALIDONE TABLET 100; 25 MG/1; MG/1
1 TABLET ORAL DAILY
Qty: 90 TABLET | Refills: 1 | Status: SHIPPED | OUTPATIENT
Start: 2024-12-02

## 2024-12-11 NOTE — PROGRESS NOTES
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 Denies CP, SOB, cough, visual changes, dizziness, palpitations or HA.      Anxiety better and stress levels improved, now off buspar  Stress levels on avg 5-6+/10 which is high for him, not on meds, we discussed trying buspar    DM-significant wt loss 30# w decr carbs, incr veggies  and protein,  exercising more.  Lab Results   Component Value Date    LABA1C 9.0 03/21/2024    LABA1C 8.1 12/06/2023    LABA1C 8.7 09/21/2023     Lab Results   Component Value Date    MALBCR 14.0 09/21/2023    CREATININE 0.9 07/12/2024     Gerd sx now minimal, also better w wt loss so off the protonix    Lipids:  Is continuing statin therapy and low fat diet.  Tolerating medications w/o myalgias or GI upset.      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    BUBBA ON NASAL CPAP AND SAW DR CADENA IN JAN.        OBJECTIVE:    /80 (Site: Left Upper Arm, Position: Sitting, Cuff Size: Medium Adult)   Pulse 74   Ht 1.93 m (6' 3.98\")   Wt (!) 173 kg (381 lb 6.4 oz)   SpO2 95%   BMI 46.45 kg/m²     Physical Exam  Constitutional:       Appearance: Normal appearance.   Cardiovascular:      Rate and Rhythm: Normal rate and regular rhythm.      Heart sounds: No murmur heard.     No gallop.   Pulmonary:      Effort: No respiratory distress.      Breath sounds: No wheezing.   Abdominal:      General: Abdomen is flat. Bowel sounds are normal. There is no distension.      Palpations: Abdomen is soft. There is no mass.      Tenderness: There is no abdominal tenderness.      Hernia: No hernia is present.   Musculoskeletal:      Right lower leg: No edema.      Left lower leg: No edema.   Neurological:      Mental Status: He is alert.   Psychiatric:         Mood and Affect: Mood normal.         ASSESSMENT:    1. Type 2 diabetes mellitus without

## 2024-12-12 ENCOUNTER — OFFICE VISIT (OUTPATIENT)
Dept: INTERNAL MEDICINE CLINIC | Age: 34
End: 2024-12-12

## 2024-12-12 VITALS
DIASTOLIC BLOOD PRESSURE: 80 MMHG | WEIGHT: 315 LBS | OXYGEN SATURATION: 95 % | SYSTOLIC BLOOD PRESSURE: 138 MMHG | HEIGHT: 76 IN | HEART RATE: 74 BPM | BODY MASS INDEX: 38.36 KG/M2

## 2024-12-12 DIAGNOSIS — E78.5 HYPERLIPIDEMIA, UNSPECIFIED HYPERLIPIDEMIA TYPE: ICD-10-CM

## 2024-12-12 DIAGNOSIS — F41.9 ANXIETY: ICD-10-CM

## 2024-12-12 DIAGNOSIS — I10 PRIMARY HYPERTENSION: ICD-10-CM

## 2024-12-12 DIAGNOSIS — E11.9 TYPE 2 DIABETES MELLITUS WITHOUT COMPLICATION, WITHOUT LONG-TERM CURRENT USE OF INSULIN (HCC): Primary | ICD-10-CM

## 2024-12-12 DIAGNOSIS — I10 ESSENTIAL HYPERTENSION: ICD-10-CM

## 2024-12-12 RX ORDER — LISINOPRIL 20 MG/1
20 TABLET ORAL DAILY
Qty: 90 TABLET | Refills: 1 | Status: SHIPPED | OUTPATIENT
Start: 2024-12-12

## 2024-12-12 RX ORDER — REPAGLINIDE 2 MG/1
2 TABLET ORAL
Qty: 270 TABLET | Refills: 1 | Status: SHIPPED | OUTPATIENT
Start: 2024-12-12 | End: 2025-06-10

## 2024-12-12 RX ORDER — PIOGLITAZONE 30 MG/1
30 TABLET ORAL DAILY
Qty: 90 TABLET | Refills: 1 | Status: SHIPPED | OUTPATIENT
Start: 2024-12-12

## 2024-12-12 RX ORDER — AMLODIPINE BESYLATE 5 MG/1
5 TABLET ORAL DAILY
Qty: 90 TABLET | Refills: 1 | Status: SHIPPED | OUTPATIENT
Start: 2024-12-12

## 2024-12-12 RX ORDER — ATENOLOL AND CHLORTHALIDONE TABLET 100; 25 MG/1; MG/1
1 TABLET ORAL DAILY
Qty: 90 TABLET | Refills: 1 | Status: SHIPPED | OUTPATIENT
Start: 2024-12-12

## 2024-12-12 RX ORDER — ROSUVASTATIN CALCIUM 5 MG/1
5 TABLET, COATED ORAL NIGHTLY
Qty: 90 TABLET | Refills: 1 | Status: SHIPPED | OUTPATIENT
Start: 2024-12-12

## 2025-01-17 DIAGNOSIS — I10 ESSENTIAL HYPERTENSION: ICD-10-CM

## 2025-01-20 RX ORDER — ATENOLOL AND CHLORTHALIDONE TABLET 100; 25 MG/1; MG/1
1 TABLET ORAL DAILY
Qty: 90 TABLET | Refills: 1 | OUTPATIENT
Start: 2025-01-20

## 2025-02-20 DIAGNOSIS — E11.9 TYPE 2 DIABETES MELLITUS WITHOUT COMPLICATION, WITHOUT LONG-TERM CURRENT USE OF INSULIN (HCC): ICD-10-CM

## 2025-02-21 RX ORDER — PIOGLITAZONE 30 MG/1
30 TABLET ORAL DAILY
Qty: 90 TABLET | Refills: 1 | OUTPATIENT
Start: 2025-02-21

## 2025-03-14 ENCOUNTER — OFFICE VISIT (OUTPATIENT)
Dept: INTERNAL MEDICINE CLINIC | Age: 35
End: 2025-03-14

## 2025-03-14 VITALS
HEIGHT: 76 IN | HEART RATE: 71 BPM | DIASTOLIC BLOOD PRESSURE: 80 MMHG | SYSTOLIC BLOOD PRESSURE: 138 MMHG | OXYGEN SATURATION: 95 % | WEIGHT: 315 LBS | BODY MASS INDEX: 38.36 KG/M2

## 2025-03-14 DIAGNOSIS — I10 PRIMARY HYPERTENSION: ICD-10-CM

## 2025-03-14 DIAGNOSIS — E11.9 TYPE 2 DIABETES MELLITUS WITHOUT COMPLICATION, WITHOUT LONG-TERM CURRENT USE OF INSULIN (HCC): ICD-10-CM

## 2025-03-14 DIAGNOSIS — E66.813 OBESITY, CLASS 3: ICD-10-CM

## 2025-03-14 DIAGNOSIS — F34.1 DYSTHYMIA: Primary | ICD-10-CM

## 2025-03-14 DIAGNOSIS — E78.5 HYPERLIPIDEMIA, UNSPECIFIED HYPERLIPIDEMIA TYPE: ICD-10-CM

## 2025-03-14 RX ORDER — SERTRALINE HYDROCHLORIDE 25 MG/1
25 TABLET, FILM COATED ORAL DAILY
Qty: 90 TABLET | Refills: 1 | Status: SHIPPED | OUTPATIENT
Start: 2025-03-14

## 2025-03-14 SDOH — ECONOMIC STABILITY: TRANSPORTATION INSECURITY
IN THE PAST 12 MONTHS, HAS THE LACK OF TRANSPORTATION KEPT YOU FROM MEDICAL APPOINTMENTS OR FROM GETTING MEDICATIONS?: NO

## 2025-03-14 SDOH — ECONOMIC STABILITY: INCOME INSECURITY: IN THE LAST 12 MONTHS, WAS THERE A TIME WHEN YOU WERE NOT ABLE TO PAY THE MORTGAGE OR RENT ON TIME?: YES

## 2025-03-14 SDOH — ECONOMIC STABILITY: FOOD INSECURITY: WITHIN THE PAST 12 MONTHS, THE FOOD YOU BOUGHT JUST DIDN'T LAST AND YOU DIDN'T HAVE MONEY TO GET MORE.: SOMETIMES TRUE

## 2025-03-14 SDOH — ECONOMIC STABILITY: FOOD INSECURITY: WITHIN THE PAST 12 MONTHS, YOU WORRIED THAT YOUR FOOD WOULD RUN OUT BEFORE YOU GOT MONEY TO BUY MORE.: SOMETIMES TRUE

## 2025-03-14 ASSESSMENT — PATIENT HEALTH QUESTIONNAIRE - PHQ9
2. FEELING DOWN, DEPRESSED OR HOPELESS: SEVERAL DAYS
1. LITTLE INTEREST OR PLEASURE IN DOING THINGS: SEVERAL DAYS
SUM OF ALL RESPONSES TO PHQ QUESTIONS 1-9: 2
SUM OF ALL RESPONSES TO PHQ9 QUESTIONS 1 & 2: 2
SUM OF ALL RESPONSES TO PHQ QUESTIONS 1-9: 2
SUM OF ALL RESPONSES TO PHQ QUESTIONS 1-9: 2
1. LITTLE INTEREST OR PLEASURE IN DOING THINGS: SEVERAL DAYS
2. FEELING DOWN, DEPRESSED OR HOPELESS: SEVERAL DAYS
SUM OF ALL RESPONSES TO PHQ QUESTIONS 1-9: 2

## 2025-03-14 NOTE — PATIENT INSTRUCTIONS
For sertraline/zoloft start at 1/2 tab daily for two weeks, then incr to 1/day.  Call us back in two months if you feel med is helpful and you want to incr dose to 50mg    For ozempic, if covered stay on this for the next three months, then check fasting lab for me before next appt in June.   [Patient] : patient [Mother] : mother

## 2025-03-14 NOTE — PROGRESS NOTES
Brennen Bedolla  1990 03/14/25    SUBJECTIVE:    The past two months w episodic down mood, low motivation, poor sleep or excessive sleep, fatigue.  Focus ok, has fatigue.  Denies guilt.  Denies any panic attacks, has rare anxiety.  Denies si/hi.      DM- due for f/u lab.  Lost wt fr last yr but seems plateaued currently  we discussed trial of ozempic    Lab Results   Component Value Date    LABA1C 9.0 03/21/2024    LABA1C 8.1 12/06/2023    LABA1C 8.7 09/21/2023     Lab Results   Component Value Date    CREATININE 0.9 07/12/2024     Has restarted gym and exercise the past two weeks.    OBJECTIVE:    /80   Pulse 71   Ht 1.93 m (6' 3.98\")   Wt (!) 172.8 kg (381 lb)   SpO2 95%   BMI 46.40 kg/m²     Physical Exam  Constitutional:       Appearance: Normal appearance.   Cardiovascular:      Rate and Rhythm: Normal rate and regular rhythm.      Heart sounds: No murmur heard.     No gallop.   Pulmonary:      Effort: No respiratory distress.      Breath sounds: No wheezing.   Abdominal:      General: Abdomen is flat. Bowel sounds are normal. There is no distension.      Palpations: Abdomen is soft. There is no mass.      Tenderness: There is no abdominal tenderness.      Hernia: No hernia is present.   Musculoskeletal:      Right lower leg: No edema.      Left lower leg: No edema.   Neurological:      Mental Status: He is alert.   Psychiatric:         Mood and Affect: Mood normal.         ASSESSMENT:    1. Dysthymia    2. Type 2 diabetes mellitus without complication, without long-term current use of insulin ([facility])    3. Hyperlipidemia, unspecified hyperlipidemia type    4. Primary hypertension    5. Obesity, class 3 (E66.813)    6. Body mass index [BMI] 45.0-49.9, adult (Z68.42)        PLAN:  Assessment & Plan    Several issues discussed today, first he appears to have mild depression or dysthymia, we will try low-dose Zoloft and have encouraged continued regular exercise.  We will reassess for response in 3

## 2025-03-18 ENCOUNTER — RESULTS FOLLOW-UP (OUTPATIENT)
Dept: INTERNAL MEDICINE CLINIC | Age: 35
End: 2025-03-18

## 2025-03-18 LAB
A/G RATIO: 1.7 RATIO (ref 0.8–2.6)
ALBUMIN: 4.5 G/DL (ref 3.5–5.2)
ALP BLD-CCNC: 93 U/L (ref 23–144)
ALT SERPL-CCNC: 22 U/L (ref 0–60)
AST SERPL-CCNC: 17 U/L (ref 0–55)
BASOPHILS ABSOLUTE: 0 K/UL (ref 0–0.3)
BASOPHILS RELATIVE PERCENT: 0.6 % (ref 0–2)
BILIRUB SERPL-MCNC: 0.4 MG/DL (ref 0–1.2)
BUN / CREAT RATIO: 14 (ref 7–25)
BUN BLDV-MCNC: 15 MG/DL (ref 3–29)
CALCIUM SERPL-MCNC: 9.5 MG/DL (ref 8.5–10.5)
CHLORIDE BLD-SCNC: 99 MEQ/L (ref 96–110)
CHOLESTEROL, TOTAL: 208 MG/DL
CO2: 27 MEQ/L (ref 19–32)
CREAT SERPL-MCNC: 1.1 MG/DL (ref 0.5–1.2)
CREATININE URINE: 330.1 MG/DL
DIFFERENTIAL COUNT: NORMAL
EOSINOPHILS ABSOLUTE: 0.2 K/UL (ref 0–0.5)
EOSINOPHILS RELATIVE PERCENT: 2.1 % (ref 0–5)
ESTIMATED GLOMERULAR FILTRATION RATE CREATININE EQUATION: 91 MLS/MIN/1.73M2
FASTING STATUS: ABNORMAL
GLOBULIN: 2.7 G/DL (ref 1.9–3.6)
GLUCOSE BLD-MCNC: 170 MG/DL (ref 70–99)
HBA1C MFR BLD: 10.3 %
HCT VFR BLD CALC: 47.7 % (ref 37.5–51)
HDLC SERPL-MCNC: 36 MG/DL
HEMOGLOBIN: 15.4 G/DL (ref 13–17.7)
IMMATURE GRANS (ABS): 0 K/UL (ref 0–0.1)
IMMATURE GRANULOCYTES %: 0.3 %
LDL CHOLESTEROL: 138 MG/DL
LYMPHOCYTES ABSOLUTE: 2.2 K/UL (ref 0.9–4.1)
LYMPHOCYTES RELATIVE PERCENT: 31.4 % (ref 14–51)
MCH RBC QN AUTO: 27.9 PG (ref 26–34)
MCHC RBC AUTO-ENTMCNC: 32.3 G/DL (ref 30.7–35.5)
MCV RBC AUTO: 86.4 FL (ref 80–100)
MICROALBUMIN/CREAT 24H UR: 6320 MCG/DL
MICROALBUMIN/CREAT UR-RTO: 19 MCG/MG CREAT.
MONOCYTES ABSOLUTE: 0.5 K/UL (ref 0.2–1)
MONOCYTES RELATIVE PERCENT: 6.5 % (ref 4–12)
NEUTROPHILS ABSOLUTE: 4.2 K/UL (ref 1.8–7.5)
NEUTROPHILS RELATIVE PERCENT: 59.1 % (ref 42–80)
PDW BLD-RTO: 13.4 %
PLATELET # BLD: 359 K/UL (ref 140–400)
PMV BLD AUTO: 9.7 FL (ref 7.2–11.7)
POTASSIUM SERPL-SCNC: 3.9 MEQ/L (ref 3.4–5.3)
RBC # BLD: 5.52 M/UL (ref 4.14–5.8)
RETICULOCYTE ABSOLUTE COUNT: 0 /100 WBC
SODIUM BLD-SCNC: 138 MEQ/L (ref 135–148)
TOTAL PROTEIN: 7.2 G/DL (ref 6–8.3)
TRIGL SERPL-MCNC: 168 MG/DL
TSH ULTRASENSITIVE: 1.81 MCIU/ML (ref 0.4–4.5)
VLDLC SERPL CALC-MCNC: 34 MG/DL (ref 4–30)
WBC # BLD: 7.1 K/UL (ref 3.5–10.9)

## 2025-03-18 RX ORDER — GLIPIZIDE 10 MG/1
10 TABLET ORAL 2 TIMES DAILY
Qty: 60 TABLET | Refills: 3 | Status: SHIPPED | OUTPATIENT
Start: 2025-03-18

## 2025-03-18 NOTE — RESULT ENCOUNTER NOTE
Please CALL OR TEXT pt, ELLIOTT'S SUGAR IS MUCH WORSE AND HE'S VERY CLOSE TO NEEDING TO START INSULIN.  THE HGB A1C JOSEPH FROM 9.0--10.3.  REC WE STOP THE PRANDIN/REPAGLINIDE AND SWITCH TO GLIPIZIDE 10MG TWICE/DAY BEFORE BREAKFAST AND SUPPER.  NEED MUCH MORE STRICT WORK W WT LOSS AND DM DIET, THEN PLS ASK ELLIOTT TO RECHECK A HGB A1C IN 6 WEEKS- DX DM.  IF A1C IS NOT DOWN TO LOW 9 BY THAT TIME WE'LL LIKELY START DAILY INSULIN SHOT

## 2025-03-20 ENCOUNTER — TELEPHONE (OUTPATIENT)
Dept: INTERNAL MEDICINE CLINIC | Age: 35
End: 2025-03-20

## 2025-03-20 NOTE — TELEPHONE ENCOUNTER
Brennen Bedolla (Key: [id number])  Rx #: [id number]  Ozempic (0.25 or 0.5 MG/DOSE) 2MG/3ML pen-injectors  Form  [facility] Commercial Electronic PA Form (2017 NCPDP)  Created  6 days ago  Sent to Plan  3 days ago  Plan Response  3 days ago  Submit Clinical Questions  Determination  Message from Plan  The member benefit does not include pharmacy drug coverage. Eligible drugs may be covered under the medical benefit.

## 2025-03-24 DIAGNOSIS — E11.9 TYPE 2 DIABETES MELLITUS WITHOUT COMPLICATION, WITHOUT LONG-TERM CURRENT USE OF INSULIN: Primary | ICD-10-CM

## 2025-03-24 DIAGNOSIS — E11.9 TYPE 2 DIABETES MELLITUS WITHOUT COMPLICATION, WITHOUT LONG-TERM CURRENT USE OF INSULIN: ICD-10-CM

## 2025-03-24 RX ORDER — HYDROCHLOROTHIAZIDE 12.5 MG/1
CAPSULE ORAL
Qty: 2 EACH | Refills: 5 | Status: SHIPPED | OUTPATIENT
Start: 2025-03-24

## 2025-03-24 RX ORDER — INSULIN GLARGINE 100 [IU]/ML
20 INJECTION, SOLUTION SUBCUTANEOUS NIGHTLY
Qty: 3 ADJUSTABLE DOSE PRE-FILLED PEN SYRINGE | Refills: 1 | Status: SHIPPED | OUTPATIENT
Start: 2025-03-24

## 2025-03-29 DIAGNOSIS — E11.9 TYPE 2 DIABETES MELLITUS WITHOUT COMPLICATION, WITHOUT LONG-TERM CURRENT USE OF INSULIN: ICD-10-CM

## 2025-03-31 RX ORDER — GLUCOSAMINE HCL/CHONDROITIN SU 500-400 MG
CAPSULE ORAL
Qty: 100 STRIP | Refills: 1 | Status: SHIPPED | OUTPATIENT
Start: 2025-03-31

## 2025-04-01 ENCOUNTER — TELEPHONE (OUTPATIENT)
Dept: INTERNAL MEDICINE CLINIC | Age: 35
End: 2025-04-01

## 2025-04-01 NOTE — TELEPHONE ENCOUNTER
Needed replacement Chandan 3. Put two at  for . Advised he can call the company for replacements and  tewo sensors we have here too. He will call us if he has trouble with Chandan 3 on phone to get RX for Harmony.    Patient stated pharmacy did not receive test strip RX. I let him know when it was sent and that they probably have not checked their e-scripts.  Sent:     Disp Refills Start End    blood glucose monitor strips 100 strip 1 3/31/2025 --    Sig: Test 1 times a day & as needed for symptoms of irregular blood glucose. Dispense sufficient amount for indicated testing frequency plus additional to accommodate PRN testing needs.    Sent to pharmacy as: Blood Glucose Test In Vitro Strip    Notes to Pharmacy: (1) Identify specific brand and product.  (2) Include specific quantity.  (3) Include frequency.    E-Prescribing Status: Receipt confirmed by pharmacy (3/31/2025  4:33 PM EDT)

## 2025-04-02 ENCOUNTER — TELEPHONE (OUTPATIENT)
Dept: INTERNAL MEDICINE CLINIC | Age: 35
End: 2025-04-02

## 2025-04-07 ENCOUNTER — TELEPHONE (OUTPATIENT)
Dept: INTERNAL MEDICINE CLINIC | Age: 35
End: 2025-04-07

## 2025-04-07 NOTE — TELEPHONE ENCOUNTER
Pt called stating he is no longer going to be using the glucose mointioring sensor due to technical difficulties and trouble getting it to stick to the skin

## 2025-04-21 NOTE — PROGRESS NOTES
Brennen Bedolla  1990 04/22/25    SUBJECTIVE:    Dm- INS NOT COVER OZEMPIC.  AT HOME THE PAST 30D AVG .  ON LANTUS 20 UNTIS, ALSO THE GLIPIZIDE 10MG bid, ACTOS.  TODAY A1C DOWN TO 8.9.  Lab Results   Component Value Date    LABA1C 10.3 (H) 03/17/2025    LABA1C 9.0 03/21/2024    LABA1C 8.1 12/06/2023     Lab Results   Component Value Date    CREATININE 1.1 03/17/2025     Htn- NO SX CP OR SOB.  BP SL HIGH TODAY    OBESITY- MORBID.  DOES HAVE TIME TO EXERCISE, WE DISCUSSED TRYING COURSE OF ADIPEX.  Controlled substances monitoring: possible medication side effects, risk of tolerance and/or dependence, and alternative treatments discussed, no signs of potential drug abuse or diversion identified and [place] report reviewed today- activity consistent with treatment plan.  OBJECTIVE:    BP (!) 142/82   Pulse 70   Resp 18   Ht 1.93 m (6' 3.98\")   Wt (!) 179.6 kg (396 lb)   SpO2 98%   BMI 48.22 kg/m²     Physical Exam  Constitutional:       Appearance: Normal appearance.   Cardiovascular:      Rate and Rhythm: Normal rate and regular rhythm.      Heart sounds: No murmur heard.     No gallop.   Pulmonary:      Effort: No respiratory distress.      Breath sounds: No wheezing.   Abdominal:      General: Abdomen is flat. Bowel sounds are normal. There is no distension.      Palpations: Abdomen is soft. There is no mass.      Tenderness: There is no abdominal tenderness.      Hernia: No hernia is present.   Musculoskeletal:      Right lower leg: No edema.      Left lower leg: No edema.   Neurological:      Mental Status: He is alert.   Psychiatric:         Mood and Affect: Mood normal.         ASSESSMENT:    1. Type 2 diabetes mellitus with hyperglycemia, without long-term current use of insulin (HCC)    2. Primary hypertension    3. Body mass index (BMI) 45.0-49.9, adult        PLAN:  Assessment & Plan    Diabetic control much improved but still not ideal, he will continue regimen and we will also try more

## 2025-04-22 ENCOUNTER — OFFICE VISIT (OUTPATIENT)
Dept: INTERNAL MEDICINE CLINIC | Age: 35
End: 2025-04-22
Payer: COMMERCIAL

## 2025-04-22 VITALS
RESPIRATION RATE: 18 BRPM | SYSTOLIC BLOOD PRESSURE: 142 MMHG | HEART RATE: 70 BPM | OXYGEN SATURATION: 98 % | WEIGHT: 315 LBS | HEIGHT: 76 IN | BODY MASS INDEX: 38.36 KG/M2 | DIASTOLIC BLOOD PRESSURE: 82 MMHG

## 2025-04-22 DIAGNOSIS — E11.65 TYPE 2 DIABETES MELLITUS WITH HYPERGLYCEMIA, WITHOUT LONG-TERM CURRENT USE OF INSULIN (HCC): Primary | ICD-10-CM

## 2025-04-22 DIAGNOSIS — I10 PRIMARY HYPERTENSION: ICD-10-CM

## 2025-04-22 PROCEDURE — 3077F SYST BP >= 140 MM HG: CPT | Performed by: INTERNAL MEDICINE

## 2025-04-22 PROCEDURE — 99213 OFFICE O/P EST LOW 20 MIN: CPT | Performed by: INTERNAL MEDICINE

## 2025-04-22 PROCEDURE — 3046F HEMOGLOBIN A1C LEVEL >9.0%: CPT | Performed by: INTERNAL MEDICINE

## 2025-04-22 PROCEDURE — 3079F DIAST BP 80-89 MM HG: CPT | Performed by: INTERNAL MEDICINE

## 2025-04-22 RX ORDER — PHENTERMINE HYDROCHLORIDE 37.5 MG/1
37.5 TABLET ORAL
Qty: 30 TABLET | Refills: 0 | Status: SHIPPED | OUTPATIENT
Start: 2025-04-22 | End: 2025-05-22

## 2025-04-22 NOTE — PATIENT INSTRUCTIONS
TRY ADIPEX FIRST AT 1/2 TAB DAILY FOR A WEEK THEN INCR TO 1/DAY AS TOLERATED    TRY A FUNMI COUNTING AP AIMING FOR 2000 FUNMI OR LESS/DAY.   To lose wt ,rec to try the phone AP MYFITNESSPAL OR THE AP LOSE IT,       NEED TO WORK ON BRISK WALKING OR OTHER EXERCISE 30MIN/DAY.     IF ANY SUGARS ARE LATER DROPPING TO <110, DECR GLIPIZIDE FROM 10 TO 5MG TWICE/DAY.  IF LATER GLC STILL DROPPING TO <110 THEN DECR TO 5MG ONCE/DAY OR LATER STOP COMPLETELY

## 2025-04-28 DIAGNOSIS — E11.9 TYPE 2 DIABETES MELLITUS WITHOUT COMPLICATION, WITHOUT LONG-TERM CURRENT USE OF INSULIN (HCC): ICD-10-CM

## 2025-04-28 RX ORDER — GLUCOSAMINE HCL/CHONDROITIN SU 500-400 MG
CAPSULE ORAL
Qty: 100 STRIP | Refills: 1 | Status: SHIPPED | OUTPATIENT
Start: 2025-04-28

## 2025-05-21 ENCOUNTER — OFFICE VISIT (OUTPATIENT)
Dept: INTERNAL MEDICINE CLINIC | Age: 35
End: 2025-05-21
Payer: COMMERCIAL

## 2025-05-21 VITALS
DIASTOLIC BLOOD PRESSURE: 82 MMHG | SYSTOLIC BLOOD PRESSURE: 132 MMHG | BODY MASS INDEX: 47.91 KG/M2 | HEART RATE: 77 BPM | OXYGEN SATURATION: 95 % | WEIGHT: 315 LBS

## 2025-05-21 DIAGNOSIS — E11.9 TYPE 2 DIABETES MELLITUS WITHOUT COMPLICATION, WITHOUT LONG-TERM CURRENT USE OF INSULIN (HCC): ICD-10-CM

## 2025-05-21 DIAGNOSIS — I10 PRIMARY HYPERTENSION: ICD-10-CM

## 2025-05-21 DIAGNOSIS — E11.65 TYPE 2 DIABETES MELLITUS WITH HYPERGLYCEMIA, WITHOUT LONG-TERM CURRENT USE OF INSULIN (HCC): ICD-10-CM

## 2025-05-21 PROCEDURE — 99213 OFFICE O/P EST LOW 20 MIN: CPT | Performed by: INTERNAL MEDICINE

## 2025-05-21 PROCEDURE — 3046F HEMOGLOBIN A1C LEVEL >9.0%: CPT | Performed by: INTERNAL MEDICINE

## 2025-05-21 PROCEDURE — 3075F SYST BP GE 130 - 139MM HG: CPT | Performed by: INTERNAL MEDICINE

## 2025-05-21 PROCEDURE — 3079F DIAST BP 80-89 MM HG: CPT | Performed by: INTERNAL MEDICINE

## 2025-05-21 RX ORDER — ROSUVASTATIN CALCIUM 5 MG/1
5 TABLET, COATED ORAL NIGHTLY
Qty: 90 TABLET | Refills: 1 | Status: SHIPPED | OUTPATIENT
Start: 2025-05-21

## 2025-05-21 RX ORDER — PIOGLITAZONE 30 MG/1
30 TABLET ORAL DAILY
Qty: 90 TABLET | Refills: 1 | Status: SHIPPED | OUTPATIENT
Start: 2025-05-21

## 2025-05-21 RX ORDER — PHENTERMINE HYDROCHLORIDE 37.5 MG/1
37.5 TABLET ORAL
Qty: 30 TABLET | Refills: 0 | Status: SHIPPED | OUTPATIENT
Start: 2025-05-21 | End: 2025-06-20

## 2025-05-21 NOTE — PATIENT INSTRUCTIONS
Rec we try one more course of adipex but take at 1/2 tab daily till there are 7 left then finish at 1/2 tab every other day

## 2025-05-21 NOTE — PROGRESS NOTES
Brennen Bedolla  1990 05/20/25    SUBJECTIVE:    DM- obesity.  Has been working on diet and w decr carbs.  Poct A1c today is 9.3 from prior 10.3.  Had tried adipex stopped a few days ago, initially stephon 1/2 tab but w full tab had significant insomnia.  Wt is down.   Lab Results   Component Value Date    LABA1C 10.3 (H) 03/17/2025    LABA1C 9.0 03/21/2024    LABA1C 8.1 12/06/2023     Lab Results   Component Value Date    CREATININE 1.1 03/17/2025     Hypertension: Stable. Denies CP, SOB, cough, visual changes, dizziness, palpitations or HA.  Blood pressure typically runs 130s rarely 140s outside of the office.        OBJECTIVE:    /82 (BP Site: Left Upper Arm, Patient Position: Sitting, BP Cuff Size: Large Adult)   Pulse 77   Wt (!) 178.4 kg (393 lb 6.4 oz)   SpO2 95%   BMI 47.91 kg/m²     Physical Exam  Constitutional:       Appearance: Normal appearance.   Cardiovascular:      Rate and Rhythm: Normal rate and regular rhythm.      Heart sounds: No murmur heard.     No gallop.   Pulmonary:      Effort: No respiratory distress.      Breath sounds: No wheezing.   Abdominal:      General: Abdomen is flat. Bowel sounds are normal. There is no distension.      Palpations: Abdomen is soft. There is no mass.      Tenderness: There is no abdominal tenderness.      Hernia: No hernia is present.   Musculoskeletal:      Right lower leg: No edema.      Left lower leg: No edema.   Neurological:      Mental Status: He is alert.   Psychiatric:         Mood and Affect: Mood normal.         ASSESSMENT:    1. Body mass index (BMI) 45.0-49.9, adult ([facility])    2. Primary hypertension    3. Type 2 diabetes mellitus with hyperglycemia, without long-term current use of insulin ([facility])    4. Type 2 diabetes mellitus without complication, without long-term current use of insulin ([facility])        PLAN:  Assessment & Plan    For his morbid obesity, is losing wt w diet and lifestyle changes, we'll try one more adipex script at low dose

## 2025-05-22 DIAGNOSIS — I10 ESSENTIAL HYPERTENSION: ICD-10-CM

## 2025-05-22 RX ORDER — ATENOLOL AND CHLORTHALIDONE TABLET 100; 25 MG/1; MG/1
1 TABLET ORAL DAILY
Qty: 90 TABLET | Refills: 1 | OUTPATIENT
Start: 2025-05-22

## 2025-05-27 DIAGNOSIS — F51.01 PRIMARY INSOMNIA: Primary | ICD-10-CM

## 2025-05-27 RX ORDER — TRAZODONE HYDROCHLORIDE 50 MG/1
50 TABLET ORAL NIGHTLY
Qty: 90 TABLET | Refills: 1 | Status: SHIPPED | OUTPATIENT
Start: 2025-05-27

## 2025-06-16 DIAGNOSIS — E11.9 TYPE 2 DIABETES MELLITUS WITHOUT COMPLICATION, WITHOUT LONG-TERM CURRENT USE OF INSULIN (HCC): ICD-10-CM

## 2025-06-18 DIAGNOSIS — E11.9 TYPE 2 DIABETES MELLITUS WITHOUT COMPLICATION, WITHOUT LONG-TERM CURRENT USE OF INSULIN (HCC): ICD-10-CM

## 2025-06-18 RX ORDER — INSULIN GLARGINE 100 [IU]/ML
INJECTION, SOLUTION SUBCUTANEOUS
Qty: 9 ML | Refills: 1 | Status: SHIPPED | OUTPATIENT
Start: 2025-06-18

## 2025-07-03 ENCOUNTER — OFFICE VISIT (OUTPATIENT)
Dept: INTERNAL MEDICINE CLINIC | Age: 35
End: 2025-07-03

## 2025-07-03 VITALS
BODY MASS INDEX: 48.71 KG/M2 | DIASTOLIC BLOOD PRESSURE: 78 MMHG | SYSTOLIC BLOOD PRESSURE: 126 MMHG | WEIGHT: 315 LBS | OXYGEN SATURATION: 97 % | HEART RATE: 83 BPM

## 2025-07-03 DIAGNOSIS — M79.605 LEG PAIN, BILATERAL: ICD-10-CM

## 2025-07-03 DIAGNOSIS — M25.561 ACUTE PAIN OF RIGHT KNEE: Primary | ICD-10-CM

## 2025-07-03 DIAGNOSIS — E11.9 TYPE 2 DIABETES MELLITUS WITHOUT COMPLICATION, WITHOUT LONG-TERM CURRENT USE OF INSULIN (HCC): ICD-10-CM

## 2025-07-03 DIAGNOSIS — M79.604 LEG PAIN, BILATERAL: ICD-10-CM

## 2025-07-03 RX ORDER — PREDNISONE 5 MG/1
TABLET ORAL
Qty: 21 TABLET | Refills: 0 | Status: SHIPPED | OUTPATIENT
Start: 2025-07-03 | End: 2025-07-03

## 2025-07-03 RX ORDER — INSULIN GLARGINE 100 [IU]/ML
30 INJECTION, SOLUTION SUBCUTANEOUS NIGHTLY
Qty: 9 ML | Refills: 1
Start: 2025-07-03

## 2025-07-03 RX ORDER — MELOXICAM 15 MG/1
15 TABLET ORAL DAILY
Qty: 15 TABLET | Refills: 0 | Status: SHIPPED | OUTPATIENT
Start: 2025-07-03

## 2025-07-03 NOTE — PROGRESS NOTES
Brennen Bedolla  1990 07/03/25    SUBJECTIVE:    Bilat calves aching the past 2 weeks, feel like he's been walking for days.  No recent strain.  No numbness or weakness.  Incr pain going up and down stairs.    Is on statin but looks like has been on crestor the past 2.5 yrs, prev on lipitor.    Sl decr pain and swelling after ibuprofen.      DM-  has had some headaches taking glipizide in am. Am sugars typically 120-140.    Lab Results   Component Value Date    LABA1C 10.3 (H) 03/17/2025    LABA1C 9.0 03/21/2024    LABA1C 8.1 12/06/2023     Lab Results   Component Value Date    CREATININE 1.1 03/17/2025   HOWEVER A1C TODAY STILL ELEVATED 10.5.  WE'LL CHECK BMP NOW AND HE'LL COMPARE TO HIS MONITOR      OBJECTIVE:    /78 (BP Site: Left Upper Arm, Patient Position: Sitting, BP Cuff Size: Large Adult)   Pulse 83   Wt (!) 181.4 kg (400 lb)   SpO2 97%   BMI 48.71 kg/m²     Physical Exam  Constitutional:       Appearance: Normal appearance.   Cardiovascular:      Rate and Rhythm: Normal rate and regular rhythm.      Heart sounds: No murmur heard.     No gallop.   Pulmonary:      Effort: No respiratory distress.      Breath sounds: No wheezing.   Abdominal:      General: Abdomen is flat. Bowel sounds are normal. There is no distension.      Palpations: Abdomen is soft. There is no mass.      Tenderness: There is no abdominal tenderness.      Hernia: No hernia is present.   Musculoskeletal:         General: Tenderness present. No swelling.      Right lower leg: No edema.      Left lower leg: No edema.      Comments: SL TENDER MEDIAL R KNEE BUT NO LAXITY, ANT DRAWER NEG, NO PAIN VARUS/VALGUS STRESS BUT ?SUSPECT POSITIVE LACHMAN TEST.    NO TENDERNESS OR PALP CORD BILAT CALVES   Neurological:      Mental Status: He is alert.   Psychiatric:         Mood and Affect: Mood normal.         ASSESSMENT:    1. Acute pain of right knee    2. Leg pain, bilateral    3. Type 2 diabetes mellitus without complication,

## 2025-07-03 NOTE — PATIENT INSTRUCTIONS
For leg aches rec hold crestor for two weeks.  If testing is ok and pain resolves in two weeks can then restart.      With steroid taper, if sugars rise >200 take 25 units lantus for that day.    Also rec for glipizide, decrease to 1/2 tab in am and 1 in pm

## 2025-07-04 LAB
BUN / CREAT RATIO: 13 (ref 7–25)
BUN BLDV-MCNC: 14 MG/DL (ref 3–29)
C REACTIVE PROTEIN (CRP), BODY FLUID: 0.73 MG/DL
CALCIUM SERPL-MCNC: 9.7 MG/DL (ref 8.5–10.5)
CHLORIDE BLD-SCNC: 99 MEQ/L (ref 96–110)
CO2: 23 MEQ/L (ref 19–32)
CREAT SERPL-MCNC: 1.1 MG/DL (ref 0.5–1.2)
ESTIMATED GLOMERULAR FILTRATION RATE CREATININE EQUATION: 91 MLS/MIN/1.73M2
FASTING STATUS: ABNORMAL
GLUCOSE BLD-MCNC: 351 MG/DL (ref 70–99)
POTASSIUM SERPL-SCNC: 3.6 MEQ/L (ref 3.4–5.3)
SODIUM BLD-SCNC: 136 MEQ/L (ref 135–148)
TOTAL CK: 834 U/L (ref 0–250)
URIC ACID: 4.6 MG/DL (ref 4–8)

## 2025-07-06 ENCOUNTER — RESULTS FOLLOW-UP (OUTPATIENT)
Dept: INTERNAL MEDICINE CLINIC | Age: 35
End: 2025-07-06

## 2025-07-07 DIAGNOSIS — M25.561 ACUTE PAIN OF RIGHT KNEE: ICD-10-CM

## 2025-07-07 DIAGNOSIS — M79.10 MYALGIA: Primary | ICD-10-CM

## 2025-07-08 NOTE — PROGRESS NOTES
Brennen Bedolla  1990 07/09/25    SUBJECTIVE:    DM- last A1c/glc high, been running lower w incr'd insulin.  2d ago 191, (190-200)  poct glc now is 170.  Bmp 7/3 was glc 351.  Been on 40 units lantus till last pm too 50.  Lab Results   Component Value Date    LABA1C 10.3 (H) 03/17/2025    LABA1C 9.0 03/21/2024    LABA1C 8.1 12/06/2023     Lab Results   Component Value Date    CREATININE 1.1 07/03/2025     Myalgias, bilat legs still aching, but sl improved.    OBJECTIVE:    BP (!) 144/96 (BP Site: Left Upper Arm, Patient Position: Sitting, BP Cuff Size: Medium Adult)   Pulse 75   Wt (!) 179.6 kg (396 lb)   SpO2 92%   BMI 48.22 kg/m²     Physical Exam  Constitutional:       Appearance: Normal appearance.   Cardiovascular:      Rate and Rhythm: Normal rate and regular rhythm.      Heart sounds: No murmur heard.     No gallop.   Pulmonary:      Effort: No respiratory distress.      Breath sounds: No wheezing.   Abdominal:      General: Abdomen is flat. Bowel sounds are normal. There is no distension.      Palpations: Abdomen is soft. There is no mass.      Tenderness: There is no abdominal tenderness.      Hernia: No hernia is present.   Musculoskeletal:         General: Tenderness (BILAT CALVES) present.      Right lower leg: No edema.      Left lower leg: No edema.   Neurological:      Mental Status: He is alert.   Psychiatric:         Mood and Affect: Mood normal.         ASSESSMENT:    1. Myositis of lower leg, unspecified laterality, unspecified myositis type    2. Leg pain, bilateral    3. Type 2 diabetes mellitus with hyperglycemia, without long-term current use of insulin ([facility])        PLAN:  Assessment & Plan    SUSPECT CAUSE OF ELEV CK IS THE CRESTOR, COMPOUNDED BY HYPERGLYCEMIA.  NOW OFF STATIN, STAYING HYDRATED.  INCR INSULIN GRADUALLY AS BELOW, F/U CK NOW, THEN CK AND BMP IN ONE WEEK.  WE ARE AIMING FOR CK TO NORMALIZE BY NEXT LAB AND -150 RANGE.    Patient Instructions   For 2 more nights

## 2025-07-09 ENCOUNTER — OFFICE VISIT (OUTPATIENT)
Dept: INTERNAL MEDICINE CLINIC | Age: 35
End: 2025-07-09

## 2025-07-09 VITALS
SYSTOLIC BLOOD PRESSURE: 144 MMHG | OXYGEN SATURATION: 92 % | HEART RATE: 75 BPM | WEIGHT: 315 LBS | DIASTOLIC BLOOD PRESSURE: 96 MMHG | BODY MASS INDEX: 48.22 KG/M2

## 2025-07-09 DIAGNOSIS — M60.9 MYOSITIS OF LOWER LEG, UNSPECIFIED LATERALITY, UNSPECIFIED MYOSITIS TYPE: Primary | ICD-10-CM

## 2025-07-09 DIAGNOSIS — E11.65 TYPE 2 DIABETES MELLITUS WITH HYPERGLYCEMIA, WITHOUT LONG-TERM CURRENT USE OF INSULIN (HCC): ICD-10-CM

## 2025-07-09 DIAGNOSIS — M79.604 LEG PAIN, BILATERAL: ICD-10-CM

## 2025-07-09 DIAGNOSIS — M79.605 LEG PAIN, BILATERAL: ICD-10-CM

## 2025-07-09 LAB
BUN / CREAT RATIO: 11 (ref 7–25)
BUN BLDV-MCNC: 12 MG/DL (ref 3–29)
CALCIUM SERPL-MCNC: 9.6 MG/DL (ref 8.5–10.5)
CHLORIDE BLD-SCNC: 100 MEQ/L (ref 96–110)
CO2: 26 MEQ/L (ref 19–32)
CREAT SERPL-MCNC: 1.1 MG/DL (ref 0.5–1.2)
ESTIMATED GLOMERULAR FILTRATION RATE CREATININE EQUATION: 91 MLS/MIN/1.73M2
FASTING STATUS: ABNORMAL
GLUCOSE BLD-MCNC: 175 MG/DL (ref 70–99)
POTASSIUM SERPL-SCNC: 3.9 MEQ/L (ref 3.4–5.3)
SODIUM BLD-SCNC: 138 MEQ/L (ref 135–148)
TOTAL CK: 760 U/L (ref 0–250)

## 2025-07-09 NOTE — PATIENT INSTRUCTIONS
For 2 more nights take 50 units, then for one week incr to 55 units/day.  Then after that week incr to 60 units/day.  Call if glc drops below 100.      Recheck lab in one week

## 2025-07-14 DIAGNOSIS — E11.9 TYPE 2 DIABETES MELLITUS WITHOUT COMPLICATION, WITHOUT LONG-TERM CURRENT USE OF INSULIN (HCC): ICD-10-CM

## 2025-07-14 RX ORDER — INSULIN GLARGINE 100 [IU]/ML
60 INJECTION, SOLUTION SUBCUTANEOUS NIGHTLY
Qty: 9 ML | Refills: 1 | Status: SHIPPED | OUTPATIENT
Start: 2025-07-14

## 2025-07-17 LAB — TOTAL CK: 692 U/L (ref 0–250)

## 2025-07-23 DIAGNOSIS — M60.9 MYOSITIS OF LOWER LEG, UNSPECIFIED LATERALITY, UNSPECIFIED MYOSITIS TYPE: Primary | ICD-10-CM

## 2025-07-23 RX ORDER — PREDNISONE 5 MG/1
TABLET ORAL
Qty: 21 TABLET | Refills: 0 | Status: SHIPPED | OUTPATIENT
Start: 2025-07-23

## 2025-07-25 RX ORDER — GLIPIZIDE 10 MG/1
10 TABLET ORAL 2 TIMES DAILY
Qty: 60 TABLET | Refills: 3 | Status: SHIPPED | OUTPATIENT
Start: 2025-07-25

## 2025-08-02 LAB — DIABETIC RETINOPATHY: NEGATIVE

## 2025-08-15 DIAGNOSIS — E11.9 TYPE 2 DIABETES MELLITUS WITHOUT COMPLICATION, WITHOUT LONG-TERM CURRENT USE OF INSULIN (HCC): ICD-10-CM

## 2025-08-15 RX ORDER — INSULIN GLARGINE 100 [IU]/ML
60 INJECTION, SOLUTION SUBCUTANEOUS NIGHTLY
Qty: 9 ML | Refills: 1 | Status: SHIPPED | OUTPATIENT
Start: 2025-08-15

## 2025-08-18 ENCOUNTER — OFFICE VISIT (OUTPATIENT)
Dept: INTERNAL MEDICINE CLINIC | Age: 35
End: 2025-08-18
Payer: COMMERCIAL

## 2025-08-18 VITALS
DIASTOLIC BLOOD PRESSURE: 104 MMHG | SYSTOLIC BLOOD PRESSURE: 156 MMHG | HEART RATE: 73 BPM | WEIGHT: 315 LBS | OXYGEN SATURATION: 96 % | BODY MASS INDEX: 48.71 KG/M2

## 2025-08-18 DIAGNOSIS — E11.9 TYPE 2 DIABETES MELLITUS WITHOUT COMPLICATION, WITHOUT LONG-TERM CURRENT USE OF INSULIN (HCC): ICD-10-CM

## 2025-08-18 DIAGNOSIS — M79.604 LEG PAIN, BILATERAL: ICD-10-CM

## 2025-08-18 DIAGNOSIS — I10 ESSENTIAL HYPERTENSION: ICD-10-CM

## 2025-08-18 DIAGNOSIS — K21.9 GASTROESOPHAGEAL REFLUX DISEASE WITHOUT ESOPHAGITIS: ICD-10-CM

## 2025-08-18 DIAGNOSIS — E78.5 HYPERLIPIDEMIA, UNSPECIFIED HYPERLIPIDEMIA TYPE: ICD-10-CM

## 2025-08-18 DIAGNOSIS — M60.9 MYOSITIS OF LOWER LEG, UNSPECIFIED LATERALITY, UNSPECIFIED MYOSITIS TYPE: Primary | ICD-10-CM

## 2025-08-18 DIAGNOSIS — M79.605 LEG PAIN, BILATERAL: ICD-10-CM

## 2025-08-18 PROCEDURE — 3046F HEMOGLOBIN A1C LEVEL >9.0%: CPT | Performed by: INTERNAL MEDICINE

## 2025-08-18 PROCEDURE — 99214 OFFICE O/P EST MOD 30 MIN: CPT | Performed by: INTERNAL MEDICINE

## 2025-08-18 PROCEDURE — 3077F SYST BP >= 140 MM HG: CPT | Performed by: INTERNAL MEDICINE

## 2025-08-18 PROCEDURE — 3080F DIAST BP >= 90 MM HG: CPT | Performed by: INTERNAL MEDICINE

## 2025-08-18 RX ORDER — GLIPIZIDE 10 MG/1
10 TABLET ORAL 2 TIMES DAILY
Qty: 60 TABLET | Refills: 3 | Status: SHIPPED | OUTPATIENT
Start: 2025-08-18

## 2025-08-18 RX ORDER — AMLODIPINE BESYLATE 10 MG/1
10 TABLET ORAL DAILY
Qty: 90 TABLET | Refills: 1 | Status: SHIPPED | OUTPATIENT
Start: 2025-08-18

## 2025-08-18 RX ORDER — OMEPRAZOLE 40 MG/1
40 CAPSULE, DELAYED RELEASE ORAL
Qty: 90 CAPSULE | Refills: 1 | Status: SHIPPED | OUTPATIENT
Start: 2025-08-18

## 2025-08-18 RX ORDER — PIOGLITAZONE 30 MG/1
30 TABLET ORAL DAILY
Qty: 90 TABLET | Refills: 1 | Status: SHIPPED | OUTPATIENT
Start: 2025-08-18 | End: 2025-08-19

## 2025-08-18 RX ORDER — LISINOPRIL 40 MG/1
40 TABLET ORAL DAILY
Qty: 90 TABLET | Refills: 1 | Status: SHIPPED | OUTPATIENT
Start: 2025-08-18

## 2025-08-18 RX ORDER — ATENOLOL AND CHLORTHALIDONE TABLET 100; 25 MG/1; MG/1
1 TABLET ORAL DAILY
Qty: 90 TABLET | Refills: 1 | Status: SHIPPED | OUTPATIENT
Start: 2025-08-18

## 2025-08-19 DIAGNOSIS — M60.9 MYOSITIS, UNSPECIFIED MYOSITIS TYPE, UNSPECIFIED SITE: Primary | ICD-10-CM

## 2025-08-19 DIAGNOSIS — E11.9 TYPE 2 DIABETES MELLITUS WITHOUT COMPLICATION, WITHOUT LONG-TERM CURRENT USE OF INSULIN (HCC): ICD-10-CM

## 2025-08-19 LAB
A/G RATIO: 1.9 RATIO (ref 0.8–2.6)
ALBUMIN: 4.5 G/DL (ref 3.5–5.2)
ALP BLD-CCNC: 85 U/L (ref 23–144)
ALT SERPL-CCNC: 25 U/L (ref 0–60)
AST SERPL-CCNC: 17 U/L (ref 0–55)
BASOPHILS ABSOLUTE: 0.1 K/UL (ref 0–0.3)
BASOPHILS RELATIVE PERCENT: 0.6 % (ref 0–2)
BILIRUB SERPL-MCNC: 0.4 MG/DL (ref 0–1.2)
BUN / CREAT RATIO: 13 (ref 7–25)
BUN BLDV-MCNC: 14 MG/DL (ref 3–29)
C REACTIVE PROTEIN (CRP), BODY FLUID: 1.03 MG/DL
CALCIUM SERPL-MCNC: 9.5 MG/DL (ref 8.5–10.5)
CHLORIDE BLD-SCNC: 100 MEQ/L (ref 96–110)
CHOLESTEROL, TOTAL: 217 MG/DL
CO2: 27 MEQ/L (ref 19–32)
CREAT SERPL-MCNC: 1.1 MG/DL (ref 0.5–1.2)
DIFFERENTIAL COUNT: NORMAL
EOSINOPHILS ABSOLUTE: 0.2 K/UL (ref 0–0.5)
EOSINOPHILS RELATIVE PERCENT: 2.7 % (ref 0–5)
ESTIMATED GLOMERULAR FILTRATION RATE CREATININE EQUATION: 91 MLS/MIN/1.73M2
FASTING STATUS: ABNORMAL
GLOBULIN: 2.4 G/DL (ref 1.9–3.6)
GLUCOSE BLD-MCNC: 174 MG/DL (ref 70–99)
HBA1C MFR BLD: 10.7 %
HCT VFR BLD CALC: 47.1 % (ref 37.5–51)
HDLC SERPL-MCNC: 31 MG/DL
HEMOGLOBIN: 15.4 G/DL (ref 13–17.7)
IMMATURE GRANS (ABS): 0 K/UL (ref 0–0.1)
IMMATURE GRANULOCYTES %: 0.2 %
LDL CHOLESTEROL: 153 MG/DL
LYMPHOCYTES ABSOLUTE: 2.7 K/UL (ref 0.9–4.1)
LYMPHOCYTES RELATIVE PERCENT: 33.5 % (ref 14–51)
MCH RBC QN AUTO: 28.6 PG (ref 26–34)
MCHC RBC AUTO-ENTMCNC: 32.7 G/DL (ref 30.7–35.5)
MCV RBC AUTO: 87.4 FL (ref 80–100)
MONOCYTES ABSOLUTE: 0.6 K/UL (ref 0.2–1)
MONOCYTES RELATIVE PERCENT: 7.4 % (ref 4–12)
NEUTROPHILS ABSOLUTE: 4.5 K/UL (ref 1.8–7.5)
NEUTROPHILS RELATIVE PERCENT: 55.6 % (ref 42–80)
PDW BLD-RTO: 13.1 %
PLATELET # BLD: 336 K/UL (ref 140–400)
PMV BLD AUTO: 9.7 FL (ref 7.2–11.7)
POTASSIUM SERPL-SCNC: 3.7 MEQ/L (ref 3.4–5.3)
RBC # BLD: 5.39 M/UL (ref 4.14–5.8)
RETICULOCYTE ABSOLUTE COUNT: 0 /100 WBC
SODIUM BLD-SCNC: 138 MEQ/L (ref 135–148)
TOTAL CK: 728 U/L (ref 0–250)
TOTAL PROTEIN: 6.9 G/DL (ref 6–8.3)
TRIGL SERPL-MCNC: 167 MG/DL
VLDLC SERPL CALC-MCNC: 33 MG/DL (ref 4–30)
WBC # BLD: 8.1 K/UL (ref 3.5–10.9)

## 2025-08-19 RX ORDER — INSULIN GLARGINE 100 [IU]/ML
50 INJECTION, SOLUTION SUBCUTANEOUS 2 TIMES DAILY
Qty: 9 ML | Refills: 1
Start: 2025-08-19

## 2025-08-26 ENCOUNTER — HOSPITAL ENCOUNTER (OUTPATIENT)
Dept: DIABETES SERVICES | Age: 35
Setting detail: THERAPIES SERIES
Discharge: HOME | End: 2025-08-26
Payer: COMMERCIAL

## 2025-08-26 PROCEDURE — 97802 MEDICAL NUTRITION INDIV IN: CPT

## 2025-08-29 LAB
BUN / CREAT RATIO: 19 (ref 7–25)
BUN BLDV-MCNC: 19 MG/DL (ref 3–29)
CALCIUM SERPL-MCNC: 9.8 MG/DL (ref 8.5–10.5)
CHLORIDE BLD-SCNC: 97 MEQ/L (ref 96–110)
CO2: 27 MEQ/L (ref 19–32)
CREAT SERPL-MCNC: 1 MG/DL (ref 0.5–1.2)
ESTIMATED GLOMERULAR FILTRATION RATE CREATININE EQUATION: 102 MLS/MIN/1.73M2
FASTING STATUS: ABNORMAL
GLUCOSE BLD-MCNC: 249 MG/DL (ref 70–99)
POTASSIUM SERPL-SCNC: 3.9 MEQ/L (ref 3.4–5.3)
SODIUM BLD-SCNC: 138 MEQ/L (ref 135–148)
TOTAL CK: 561 U/L (ref 0–250)